# Patient Record
Sex: MALE | Race: WHITE | Employment: FULL TIME | URBAN - NONMETROPOLITAN AREA
[De-identification: names, ages, dates, MRNs, and addresses within clinical notes are randomized per-mention and may not be internally consistent; named-entity substitution may affect disease eponyms.]

---

## 2017-03-09 ENCOUNTER — TRANSFERRED RECORDS (OUTPATIENT)
Dept: HEALTH INFORMATION MANAGEMENT | Facility: HOSPITAL | Age: 42
End: 2017-03-09

## 2017-03-10 ENCOUNTER — HOSPITAL ENCOUNTER (INPATIENT)
Facility: HOSPITAL | Age: 42
LOS: 11 days | Discharge: HOME | DRG: 885 | End: 2017-03-21
Attending: PSYCHIATRY & NEUROLOGY | Admitting: PSYCHIATRY & NEUROLOGY
Payer: COMMERCIAL

## 2017-03-10 DIAGNOSIS — F29 PSYCHOSIS, UNSPECIFIED PSYCHOSIS TYPE (H): Primary | ICD-10-CM

## 2017-03-10 PROCEDURE — 25000132 ZZH RX MED GY IP 250 OP 250 PS 637: Performed by: NURSE PRACTITIONER

## 2017-03-10 PROCEDURE — 12400000 ZZH R&B MH

## 2017-03-10 RX ORDER — LURASIDONE HYDROCHLORIDE 40 MG/1
40 TABLET, FILM COATED ORAL
Status: DISCONTINUED | OUTPATIENT
Start: 2017-03-10 | End: 2017-03-11

## 2017-03-10 RX ORDER — OLANZAPINE 10 MG/1
10 TABLET ORAL
Status: DISCONTINUED | OUTPATIENT
Start: 2017-03-10 | End: 2017-03-11

## 2017-03-10 RX ORDER — HYDROXYZINE HYDROCHLORIDE 25 MG/1
25-50 TABLET, FILM COATED ORAL EVERY 4 HOURS PRN
Status: DISCONTINUED | OUTPATIENT
Start: 2017-03-10 | End: 2017-03-21 | Stop reason: HOSPADM

## 2017-03-10 RX ORDER — NICOTINE 21 MG/24HR
1 PATCH, TRANSDERMAL 24 HOURS TRANSDERMAL DAILY
Status: DISCONTINUED | OUTPATIENT
Start: 2017-03-10 | End: 2017-03-21 | Stop reason: HOSPADM

## 2017-03-10 RX ORDER — TRAZODONE HYDROCHLORIDE 50 MG/1
50 TABLET, FILM COATED ORAL
Status: DISCONTINUED | OUTPATIENT
Start: 2017-03-10 | End: 2017-03-13

## 2017-03-10 RX ORDER — ACETAMINOPHEN 325 MG/1
650 TABLET ORAL EVERY 4 HOURS PRN
Status: DISCONTINUED | OUTPATIENT
Start: 2017-03-10 | End: 2017-03-21 | Stop reason: HOSPADM

## 2017-03-10 RX ORDER — OLANZAPINE 10 MG/2ML
10 INJECTION, POWDER, FOR SOLUTION INTRAMUSCULAR
Status: DISCONTINUED | OUTPATIENT
Start: 2017-03-10 | End: 2017-03-11

## 2017-03-10 RX ADMIN — LURASIDONE HYDROCHLORIDE 40 MG: 40 TABLET, FILM COATED ORAL at 17:11

## 2017-03-10 RX ADMIN — NICOTINE 1 PATCH: 21 PATCH, EXTENDED RELEASE TRANSDERMAL at 13:27

## 2017-03-10 ASSESSMENT — ACTIVITIES OF DAILY LIVING (ADL)
FALL_HISTORY_WITHIN_LAST_SIX_MONTHS: NO
ORAL_HYGIENE: INDEPENDENT
GROOMING: INDEPENDENT
SWALLOWING: 0-->SWALLOWS FOODS/LIQUIDS WITHOUT DIFFICULTY
RETIRED_COMMUNICATION: 0-->UNDERSTANDS/COMMUNICATES WITHOUT DIFFICULTY
DRESS: 0-->INDEPENDENT
BATHING: 0-->INDEPENDENT
TOILETING: 0-->INDEPENDENT
COGNITION: 0 - NO COGNITION ISSUES REPORTED
RETIRED_EATING: 0-->INDEPENDENT
DRESS: SCRUBS (BEHAVIORAL HEALTH)
AMBULATION: 0-->INDEPENDENT
TRANSFERRING: 0-->INDEPENDENT
LAUNDRY: UNABLE TO COMPLETE

## 2017-03-10 NOTE — IP AVS SNAPSHOT
[facility]    [address] [place] [address]    Phone:  [phone]    Fax:  [phone]                                       After Visit Summary   3/10/2017    Bandar Lawson    MRN: [id number]           After Visit Summary Signature Page     I have received my discharge instructions, and my questions have been answered. I have discussed any challenges I see with this plan with the nurse or doctor.    ..........................................................................................................................................  Patient/Patient Representative Signature      ..........................................................................................................................................  Patient Representative Print Name and Relationship to Patient    ..................................................               ................................................  Date                                            Time    ..........................................................................................................................................  Reviewed by Signature/Title    ...................................................              ..............................................  Date                                                            Time

## 2017-03-10 NOTE — PLAN OF CARE
Problem: Goal Outcome Summary  Goal: Goal Outcome Summary  ADMISSION NOTE     Reason for admission suicidal ideation, paranoia and psychosis .  Safety concerns active suicidal ideation with a plan.  Risk for or history of violence-none known.      Patient arrived on unit from [facility] accompanied by ambulance staff and [facility] on 3/10/2017  10:30 AM.   Status on arrival: calm, cooperative, anxious.  /87  Pulse 87  Temp 97.6  F (36.4  C) (Tympanic)  Resp 18  SpO2 98%  Patient given tour of unit and Welcome to  unit papers given to patient, wanding completed, belongings inventoried, and admission assessment completed.   Patient's legal status on arrival is 72 Hour Hold . Appropriate legal rights discussed with and copy given to patient. Patient Bill of Rights discussed with and copy given to patient.   Patient denies SI, HI, and thoughts of self harm and contracts for safety while on unit.       Chio Ford  3/10/2017  11:08 AM
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
## 2017-03-10 NOTE — PLAN OF CARE
Face to face end of shift report received from JULIO CESAR Barroso. Rounding completed. Patient [place] in [facility].      George Germain  3/10/2017  4:04 PM

## 2017-03-10 NOTE — PLAN OF CARE
"0450- Received nurse to nurse report from Eugenia HARRELL at [facility]. Per report pt was brought in to their ED by his sister. 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 Pt took all his money out of his bank account and was going to \"make a run for it.\" Pt called his sister and reported to her that he was [place] the [place] of [place]. Pt agreed to meet the sister half way which was [facility]. When brought into the ED pt reported that he wanted to shoot himself but had no guns available to him. Pt recently was prescribed Latuda but has not yet started the taking the medication. Pt received 2 bags of fluid in the ED due to pt coming in tachycardic. PB was also elevated on admission but has now returned to normal limits. Pt also received Zyprexa 20 mg PO at 2150 on 3/9. Per report pt tox screen and BA was negative, no history of violence, no medical issues pertinent to his care, no allergies, and no assistive devises. Pt has been placed on a hold however no hold papers were faxed over, will be faxing over hold papers. Pharmacy unknown at this time. Per report pt has been cooperative with staff but becomes paranoid easily. Pt also has been pushing on doors in the ED attempting to flea. [facility] ED will call with an ETA when transportation has been set up. Phone number is [phone].     9713- Received call that pt has left their ED. Pt was given Zyprexa 10 mg at 0640 for \"getting anxious and worked up.\"   "

## 2017-03-10 NOTE — PROGRESS NOTES
03/10/17 1121   Patient Belongings   Did you bring any home meds/supplements to the hospital?  No   Patient Belongings other (see comments)   Disposition of Belongings sent to cubby in patient belongings room   Belongings Search Yes   Clothing Search Yes   Second Staff Navarro   General Info Comment Brown cowboy boots, brown carhart jackect, 1 pair blue jeans, 1 green shirt, 1 red and black checked fleece jacket, 1 brown cap (CAT), 1 pair plaid boxers, 1 pair white socks, 1 pair blue scrubs, red lighter chap stick, empty baggie with nicotine gum wrapper    List items sent to safe: $5,150.36, 1 black wallet, [place], 1 Visa ([facility] [employer]), misc. cards, Flip phone, set of keys    All other belongings put in assigned cubby in belongings room.     I have reviewed my belongings list on admission and verify that it is correct.     Patient signature_______________________________    Second staff witness (if patient unable to sign) ______________________________       I have received all my belongings at discharge.    Patient signature________________________________    Aura   3/10/2017  11:25 AM

## 2017-03-10 NOTE — IP AVS SNAPSHOT
MRN:[id number]                      After Visit Summary   3/10/2017    Bandar Lawson    MRN: [id number]           Thank you!     Thank you for choosing [facility] for your care. Our goal is always to provide you with excellent care. Hearing back from our patients is one way we can continue to improve our services. Please take a few minutes to complete the written survey that you may receive in the mail after you visit with us. Thank you!        Patient Information     Date Of Birth          1975        About your hospital stay     You were admitted on:  March 10, 2017 You last received care in the: [facility]    You were discharged on:  March 21, 2017       Who to Call     For medical emergencies, please call 911.  For non-urgent questions about your medical care, please call your primary care provider or clinic, [phone]          Attending Provider     Provider Specialty    Samuel Pereira MD Psychiatry    Joyce Nick APRN CNP Nurse Practitioner       Primary Care Provider Office Phone # Fax #    Edgar Powers [phone]       [facility] [id number] [place] [address]        Further instructions from your care team       Behavioral Discharge Planning and Instructions    Summary: Bandar was admitted with suicidal ideation, paranoia and psychosis.    Main Diagnosis: delusional disorder vs schizoaffective disorder vs mood disorder w/ psychotic features.    Major Treatments, Procedures and Findings: Stabilize with medications, connect with community programs.     Symptoms to Report: feeling more aggressive, increased confusion, losing more sleep, mood getting worse or thoughts of suicide    Lifestyle Adjustment: Take all medications as prescribed, meet with doctor/medication provider as scheduled. Abstain from alcohol or any unprescribed drugs.     Psychiatry Follow-up:    [facility]   [employer] - Joyce Nick -  March 29th, @ 8:30 am.  [address]   [place] [address]  Phone: [phone]  Fax: [phone]     [facility] Care Coordinator: Kiara [phone]    [facility] : Kaylee Gomez- call [phone] Call to follow up with.  [facility]   [address]  3rd floor, [address]  [place] [address]  Phone: [phone]  Fax: [phone]    Resources:   Crisis Intervention: 483.165.4143 or 319-300-2606 (TTY: 602.551.2430).  Call anytime for help.  National [place] on Mental Illness (www.[place].andressa.org): 496.870.4953 or 221-903-3535.  Alcoholics Anonymous (www.alcoholics-anonymous.org): Check your phone book for your local chapter.  Suicide Awareness Voices of Education (SAVE) (www.save.org): 181-192-CUPW (7300)  National Suicide Prevention Line (www.mentalhealthmn.org): 591-109-ZXJY (1572)  Mental Health Consumer/Survivor Network of [place] (www.mhcsn.net): 302.602.9272 or 316-971-9189  Mental Health Association of [place] (www.mentalhealth.org): 155.554.2961 or 333-629-7615    General Medication Instructions:   See your medication sheet(s) for instructions.   Take all medicines as directed.  Make no changes unless your doctor suggests them.   Go to all your doctor visits.  Be sure to have all your required lab tests. This way, your medicines can be refilled on time.  Do not use any drugs not prescribed by your doctor.  Avoid alcohol.    [place] Area:  [facility][1], [place] stabilization [facility]- [phone]  Replaced by [facility] Crisis Line: 1-117.851.7182  Advocates For Family Peace: 991-4685  Sexual Assault Program [facility]: 974.711.1930 or 1-481.347.7335  [place] Battered Women's Program: 8-141-602-7621 Ext: 279       Calls answered Mon-Fri-8:00 am--4:30 pm    [place]:  Advocates for Family Peace: 1-269.470.2109  [facility] first call for help: 1-616.537.1066  [facility]:  (728) 669-7421      [place] Area:  Warm Line: 1-106.658.5477        "Calls answered Tuesday--Saturday 4:00 pm--10:00 pm  [place] Crisis Line - 366-297-3279  [place] [facility] Stabilization 291-290-2892    [place] Statewide:  [place] Crisis and Referral Services: 1-751.372.2809  National Suicide Prevention Lifeline: 8-304-899-TALK (6482)   - udb2lutr- Text  Life  to [address]  First Call for Help:   [place] Helpline- 6-562-BZCL-HELP     Pending Results     No orders found from 3/8/2017 to 3/11/2017.            Statement of Approval     Ordered          17 0906  I have reviewed and agree with all the recommendations and orders detailed in this document.  EFFECTIVE NOW     Approved and electronically signed by:  Keily Luo NP             Admission Information     Date & Time Provider Department Dept. Phone    3/10/2017 Joyce Nick APRN CNP [facility] [phone]      Your Vitals Were     Blood Pressure Pulse Temperature Respirations Weight Pulse Oximetry    128/71 97 97.9  F (36.6  C) (Tympanic) 18 95.3 kg (210 lb 1.6 oz) 98%    BMI (Body Mass Index)                   27.72 kg/m2           MyChart Information     [employer] lets you send messages to your doctor, view your test results, renew your prescriptions, schedule appointments and more. To sign up, go to [email or web address] . Click on \"Log in\" on the left side of the screen, which will take you to the Welcome page. Then click on \"Sign up Now\" on the right side of the page.     You will be asked to enter the access code listed below, as well as some personal information. Please follow the directions to create your username and password.     Your access code is: [email or web address]  Expires: 2017  9:05 AM     Your access code will  in 90 days. If you need help or a new code, please call your [facility] clinic or [phone].        Care EveryWhere ID     This is your Care EveryWhere ID. This could be used by other organizations to access your [facility] medical records  [id number]           Review of your " medicines      START taking        Dose / Directions    paliperidone 6 MG 24 hr tablet   Commonly known as:  INVEGA        Dose:  6 mg   Take 1 tablet (6 mg) by mouth daily   Quantity:  30 tablet   Refills:  0       valproic acid 250 MG/5ML Syrp syrup   Commonly known as:  DEPAKENE        Dose:  750 mg   Take 15 mLs (750 mg) by mouth every 12 hours   Quantity:  650 mL   Refills:  0         STOP taking     LATUDA PO                Where to get your medicines      These medications were sent to [facility] [address] [place] [address] AT NEC OF [address] [place] [address]  [address] [address]     Phone:  [phone]     paliperidone 6 MG 24 hr tablet    valproic acid 250 MG/5ML Syrp syrup                Protect others around you: Learn how to safely use, store and throw away your medicines at www.disposemymeds.org.             Medication List: This is a list of all your medications and when to take them. Check marks below indicate your daily home schedule. Keep this list as a reference.      Medications           Morning Afternoon Evening Bedtime As Needed    paliperidone 6 MG 24 hr tablet   Commonly known as:  INVEGA   Take 1 tablet (6 mg) by mouth daily   Last time this was given:  6 mg on 3/21/2017  8:08 AM                                valproic acid 250 MG/5ML Syrp syrup   Commonly known as:  DEPAKENE   Take 15 mLs (750 mg) by mouth every 12 hours   Last time this was given:  750 mg on 3/21/2017  8:08 AM

## 2017-03-10 NOTE — PLAN OF CARE
Problem: Discharge Planning  Goal: Discharge Planning (Adult, OB, Behavioral, Peds)  Outcome: No Change  Patient arrived on the unit - was given Zyprexa in the ED and is sleeping soundly - will wait to do assessment until the patient is more awake and able to participate.

## 2017-03-11 PROCEDURE — 99223 1ST HOSP IP/OBS HIGH 75: CPT | Performed by: NURSE PRACTITIONER

## 2017-03-11 PROCEDURE — 12400000 ZZH R&B MH

## 2017-03-11 PROCEDURE — 25000132 ZZH RX MED GY IP 250 OP 250 PS 637: Performed by: NURSE PRACTITIONER

## 2017-03-11 RX ORDER — BENZTROPINE MESYLATE 0.5 MG/1
0.5 TABLET ORAL 2 TIMES DAILY PRN
Status: DISCONTINUED | OUTPATIENT
Start: 2017-03-11 | End: 2017-03-21 | Stop reason: HOSPADM

## 2017-03-11 RX ORDER — LURASIDONE HYDROCHLORIDE 80 MG/1
80 TABLET, FILM COATED ORAL
Status: DISCONTINUED | OUTPATIENT
Start: 2017-03-12 | End: 2017-03-12

## 2017-03-11 RX ORDER — OLANZAPINE 10 MG/1
10 TABLET ORAL
Status: DISCONTINUED | OUTPATIENT
Start: 2017-03-11 | End: 2017-03-12

## 2017-03-11 RX ORDER — OLANZAPINE 10 MG/2ML
10 INJECTION, POWDER, FOR SOLUTION INTRAMUSCULAR
Status: DISCONTINUED | OUTPATIENT
Start: 2017-03-11 | End: 2017-03-12

## 2017-03-11 RX ORDER — DIPHENHYDRAMINE HCL 25 MG
25 CAPSULE ORAL EVERY 4 HOURS PRN
Status: DISCONTINUED | OUTPATIENT
Start: 2017-03-11 | End: 2017-03-21 | Stop reason: HOSPADM

## 2017-03-11 RX ORDER — LURASIDONE HYDROCHLORIDE 40 MG/1
40 TABLET, FILM COATED ORAL
Status: COMPLETED | OUTPATIENT
Start: 2017-03-11 | End: 2017-03-11

## 2017-03-11 RX ADMIN — OLANZAPINE 10 MG: 10 TABLET, FILM COATED ORAL at 15:19

## 2017-03-11 RX ADMIN — NICOTINE 1 PATCH: 21 PATCH, EXTENDED RELEASE TRANSDERMAL at 08:31

## 2017-03-11 RX ADMIN — DIPHENHYDRAMINE HYDROCHLORIDE 25 MG: 25 CAPSULE ORAL at 11:24

## 2017-03-11 RX ADMIN — OLANZAPINE 10 MG: 10 TABLET, FILM COATED ORAL at 21:08

## 2017-03-11 RX ADMIN — LURASIDONE HYDROCHLORIDE 40 MG: 40 TABLET, FILM COATED ORAL at 16:40

## 2017-03-11 RX ADMIN — OLANZAPINE 10 MG: 10 TABLET, FILM COATED ORAL at 09:04

## 2017-03-11 ASSESSMENT — ACTIVITIES OF DAILY LIVING (ADL)
ORAL_HYGIENE: INDEPENDENT
GROOMING: INDEPENDENT
DRESS: INDEPENDENT;SCRUBS (BEHAVIORAL HEALTH)
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
## 2017-03-11 NOTE — PLAN OF CARE
Face to face end of shift report received from George HUITRON RN. Rounding completed. Patient observed in [facility].     Jane Ricks  3/11/2017  3:25 PM

## 2017-03-11 NOTE — PLAN OF CARE
Face to face end of shift report received from JULIO CESAR Phillip. Rounding completed. Patient observed in room.      George Germain  3/11/2017  7:49 AM

## 2017-03-11 NOTE — PLAN OF CARE
Problem: Goal Outcome Summary  Goal: Goal Outcome Summary  0000 patient in bed with eyes closed and respirations easy presenting sleeping. Status 15 continues. 0522 patient continues to sleep this shift

## 2017-03-11 NOTE — PROGRESS NOTES
Face to face end of shift report received from gracy griggs at 2300 report.. Rounding completed. Patient observed.     Frida Grady  3/11/2017  12:34 AM

## 2017-03-11 NOTE — PLAN OF CARE
"Problem: Goal Outcome Summary  Goal: Goal Outcome Summary  He is awake this morning. He ate about 75% of breakfast. He is quite paranoid and feels that people are after him. This writer talked with him about the paranoia being a symptom of his mental illness and that it is possible that people are not actually out to get him.  He states \"I suppose, but I know they are after me\".  He accepted zyprexa 10mg orally at 0904. He continues to be anxious and then starts asking for his clothing and wanting to go home. He talked with his parents on the phone and was feeling upset that they may not visit. His mother did call the unit and said that they will visit this evening. He was informed of this. He asked several times for his clothing and to leave with increased paranoia. He did then return to the ICU without any difficulty.   1124: Patient continues to be somewhat restless. Given benadryl 25 mg at this time. He requests to wean to the open unit again and is doing so now. [facility] and is watching others. He does walk a bit in the shafer but doesn't sit for long.   1450: Patient has been in his room sleeping for about an hour.   1525: Patient requesting to come to the open unit. Patient is anxious and accepts zyprexa 10mg at this time. He then is brought into the group room for group.     Problem: Suicide Risk (Adult)  Goal: Strength-Based Wellness/Recovery  Patient will deny suicidal ideation by discharge.   He initially denies any suicidal thoughts. But then he asks for a gun and makes statements about \"this is the end, it's all over for me\".   Goal: Physical Safety  Patient will be free from harming self and others while on the unit until discharge.   He is not acting on any thoughts of self harm.     Problem: Additional Goals: Nursing Focus  Goal: 1. Patient Goal: Nursing Focus  Patient may wean to the open unit as long as behavior remains appropriate. Will reassess daily.   He has weaned for about " 20 minutes at a time and has difficulty focusing [facility]    Problem: Thought Process Alteration (Adult)  Goal: Improved Thought Process  Paranoid thoughts will resolve by discharge.   He continues to be paranoid and feels that people are after him. He is anxious and paces in his room.

## 2017-03-12 PROCEDURE — 12400000 ZZH R&B MH

## 2017-03-12 PROCEDURE — 99232 SBSQ HOSP IP/OBS MODERATE 35: CPT | Performed by: NURSE PRACTITIONER

## 2017-03-12 PROCEDURE — 25000132 ZZH RX MED GY IP 250 OP 250 PS 637: Performed by: NURSE PRACTITIONER

## 2017-03-12 RX ORDER — DIVALPROEX SODIUM 250 MG/1
250 TABLET, DELAYED RELEASE ORAL EVERY 12 HOURS SCHEDULED
Status: DISCONTINUED | OUTPATIENT
Start: 2017-03-12 | End: 2017-03-13

## 2017-03-12 RX ORDER — ARIPIPRAZOLE 10 MG/1
10 TABLET, ORALLY DISINTEGRATING ORAL DAILY
Status: DISCONTINUED | OUTPATIENT
Start: 2017-03-12 | End: 2017-03-13

## 2017-03-12 RX ADMIN — NICOTINE 1 PATCH: 21 PATCH, EXTENDED RELEASE TRANSDERMAL at 08:36

## 2017-03-12 RX ADMIN — OLANZAPINE 10 MG: 10 TABLET, FILM COATED ORAL at 08:36

## 2017-03-12 RX ADMIN — NICOTINE POLACRILEX 2 MG: 2 GUM, CHEWING ORAL at 22:12

## 2017-03-12 RX ADMIN — ARIPIPRAZOLE 10 MG: 10 TABLET, ORALLY DISINTEGRATING ORAL at 12:15

## 2017-03-12 RX ADMIN — NICOTINE POLACRILEX 2 MG: 2 GUM, CHEWING ORAL at 18:56

## 2017-03-12 RX ADMIN — DIPHENHYDRAMINE HYDROCHLORIDE 25 MG: 25 CAPSULE ORAL at 08:37

## 2017-03-12 RX ADMIN — DIVALPROEX SODIUM 250 MG: 250 TABLET, DELAYED RELEASE ORAL at 20:04

## 2017-03-12 RX ADMIN — DIVALPROEX SODIUM 250 MG: 250 TABLET, DELAYED RELEASE ORAL at 12:15

## 2017-03-12 ASSESSMENT — ACTIVITIES OF DAILY LIVING (ADL)
DRESS: INDEPENDENT
ORAL_HYGIENE: INDEPENDENT
GROOMING: INDEPENDENT

## 2017-03-12 NOTE — PLAN OF CARE
Problem: Goal Outcome Summary  Goal: Goal Outcome Summary  Outcome: No Change  Pt has been pleasant and cooperative with staff this shift. Pt reports feeling less paranoid and feels as though the medication is helping with his thoughts. Pt denied having depression, SI, and HI. He does endorse some anxiety but feels safe here. Pt has weaned out to the open unit, attended group and has been appropriate with his behaviors. Pt has asked a couple times this shift when he was leaving and stated to staff that he thinks his hold is up on Tuesday and is hoping to leave then. Pt denied having any pain and has no further questions at this time. Will continue to monitor.     1900- Pt has continued to be appropriate with his behaviors and has weaned to the open unit. Pt is socializing and playing cards with peers.     Problem: Suicide Risk (Adult)  Goal: Strength-Based Wellness/Recovery  Patient will deny suicidal ideation by discharge.   Outcome: No Change  Pt denied having any SI at this time. Will continue to monitor.   Goal: Physical Safety  Patient will be free from harming self and others while on the unit until discharge.   Outcome: No Change  Pt has remained free from self harm at this time. Will continue to monitor.     Problem: Additional Goals: Nursing Focus  Goal: 1. Patient Goal: Nursing Focus  Patient able to wean to open unit as long as his behavior remains in control. He will be compliant with safety check prior to returning to the [facility].   Outcome: Improving  Pt has wean to open unit this shift. Pt has remained appropriate at this time.     Problem: Thought Process Alteration (Adult)  Goal: Improved Thought Process  Paranoid thoughts will resolve by discharge.   Outcome: Improving  Pt reported that the paranoid thoughts are decreasing.

## 2017-03-12 NOTE — PROGRESS NOTES
"[facility]  Psychiatric Progress Note    Subjective       This is a 41 year old male admitted w/ delusional disorder vs schizoaffective disorder vs mood disorder w/ psychotic features. Bandar this am reports his paranoia is \"less intense\" than yesterday but \"still there\". He is very focused on \"getting out of here and getting back to work\". I reminded him that his employer just wants him to get better his supervisor is concerned about him, and had stated Bandar is an outstanding employee and they want him back.     I spoke w/ parents last evening when they came to visit, they are concerned that he may be released following his hold, before his symptoms are controlled. He has never taken a large amount of money out of the bank and taken off before which concerns them greatly.      Bandar denies feelings of depression, denies anxiety, AH/VH, and SI/HI. No statements of self harm today his reply when questioned was \"I would never do that\". Bandar reports \"really good sleep\" when he slept last night. No statements today about the \"famous woman\" he continues to make statements re: fear of being outside as he will not live long with them after him. Will not define \"them\".         DIagnoses:     Delusional disorder vs schizoaffective disorder vs mood disorder w/ psychotic features  Attestation:  Patient has been seen and evaluated by me, Joyce Nick, APRN CNP, in the presence of the house staff team          Interim History:   The patient's care was discussed with the treatment team and chart notes were reviewed.          Medications:       lurasidone  80 mg Oral Daily with supper     nicotine   Transdermal Q8H     nicotine   Transdermal Daily     nicotine  1 patch Transdermal Daily     OLANZapine **OR** OLANZapine, diphenhydrAMINE, benztropine, hydrOXYzine, acetaminophen, traZODone          Allergies:   No Known Allergies         Psychiatric Examination:   /66  Pulse 100  Temp 98.6  F (37  C) (Tympanic) "  Resp 14  SpO2 98%  Weight is 0 lbs 0 oz  There is no height or weight on file to calculate BMI.    Appearance:  awake, alert and dressed in hospital scrubs  Attitude:  guarded  Eye Contact:  fair  Mood:  anxious  Affect:  mood congruent  Speech:  clear, coherent  Psychomotor Behavior:  no evidence of tardive dyskinesia, dystonia, or tics and intact station, gait and muscle tone  Thought Process:  goal oriented  Associations:  no loose associations  Thought Content:  no evidence of suicidal ideation or homicidal ideation and patient appears to be responding to internal stimuli  Insight:  limited  Judgment:  limited  Oriented to:  time, person, and place  Attention Span and Concentration:  fair  Recent and Remote Memory:  fair  Fund of Knowledge: appropriate  Muscle Strength and Tone: normal  Gait and Station: Normal  Perception : pt remains paranoid and delusional         Labs:   No results found for this or any previous visit (from the past 24 hour(s)).          Assessment/ Plan:   Medications: Continue on current medications.

## 2017-03-12 NOTE — PLAN OF CARE
Face to face end of shift report received from JULIO CESAR Phillip. Rounding completed. Patient observed in room.      George Germain  3/12/2017  7:42 AM

## 2017-03-12 NOTE — PLAN OF CARE
"Problem: Goal Outcome Summary  Goal: Goal Outcome Summary  He continues with depression, anxiety, and suicidal thinking. He is preoccupied and quite paranoid. He needs a lot of reassurance and redirection. He was given zyprexa 10mg and benadryl 25mg at 0836 with little relief. Practitioner aware and making medication change.   1500:  Patient started depakote 250mg and abilify 10mg at about noon today. He rested for a while then weaned to the open unit from 1430 to 1500 then returned to the MHICU was appropriate with safety check.     Problem: Suicide Risk (Adult)  Goal: Strength-Based Wellness/Recovery  Patient will deny suicidal ideation by discharge.   When asked about suicidal thoughts he says \"yes, I think so\" and does contract for safety at that time.   Goal: Physical Safety  Patient will be free from harming self and others while on the unit until discharge.   He has not make any attempts to harm himself.     Problem: Additional Goals: Nursing Focus  Goal: 1. Patient Goal: Nursing Focus  Patient able to wean to open unit as long as his behavior remains in control. He will be compliant with safety check prior to returning to the MHICU.   Behavior appropriate this morning. Ate breakfast in the lounge. Then became increasingly paranoid and was banging on the entry doors to the unit.  Easily redirected back to MHICU and compliant with safety check.    Problem: Thought Process Alteration (Adult)  Goal: Improved Thought Process  Paranoid thoughts will resolve by discharge.   Continues with paranoia. Patient weaning to the open unit and ate breakfast. He was then walking in the halls. He went to the entry doors and was banging on them with his fists, saying \"I need to get out of here\". He was easily redirected back into the MHICU.       "

## 2017-03-12 NOTE — PLAN OF CARE
Problem: Goal Outcome Summary  Goal: Goal Outcome Summary  0000  Patient in bed with easy respirations, presenting sleeping. In the mhicu, still status 15. 0526 patient continues to sleep at this time. daylilght savings time night.

## 2017-03-12 NOTE — PROGRESS NOTES
Face to face end of shift report received from amaya griggs at 2300 report.. Rounding completed. Patient observed.     Frida Grady  3/12/2017  12:31 AM

## 2017-03-12 NOTE — PLAN OF CARE
"Problem: Suicide Risk (Adult)  Goal: Strength-Based Wellness/Recovery  Patient will deny suicidal ideation by discharge.   Outcome: Improving  Pt denies SI HI SIB  Goal: Physical Safety  Patient will be free from harming self and others while on the unit until discharge.   Outcome: No Change  Pt has been free of self harm and harm to other thus far this shift    Problem: Additional Goals: Nursing Focus  Goal: 1. Patient Goal: Nursing Focus  Patient may wean to the open unit as long as behavior remains appropriate. Will reassess daily.   Outcome: No Change  Pt able to wean this shift up until 2015. At which time it was discovered during environmentals that pt had 3 colored pencils under his mattress.  This was reported to charge nurse and writer. Writer called security to accompany writer to back along with charge nurse. Pt approached about pencils and he stated \" yes I did. I wanted to scribble\". Pt asked to go to bathroom and change into new scrubs while security did a second look around room. Writer watch pt change scrubs. Security did not find any other contraband. Pt returned to bed.    Problem: Thought Process Alteration (Adult)  Goal: Improved Thought Process  Paranoid thoughts will resolve by discharge.   Outcome: No Change  Pt appears to be paranoid but does not answer questions related to topic      "

## 2017-03-12 NOTE — PLAN OF CARE
Face to face end of shift report received from George DORAN RN. Rounding completed. Patient observed resting in bed.     Jhony Bocanegra  3/12/2017  3:27 PM

## 2017-03-12 NOTE — PLAN OF CARE
"Problem: Goal Outcome Summary  Goal: Goal Outcome Summary  Outcome: No Change  Pt does not say much when asked assessment questions. Pt did deny SI HI SIB. Pt denies hallucinations but appears preoccupied. Pt stares at writer intensely. Pt has a short delay with responses. Pt has blunted affect. Pt attended some groups. Pts parents and brother visited this evening. Not a lot of interaction during visit. Pt got up several times and walked away and then returned to visit    2136: Pt able to wean this shift up until 2015. At which time it was discovered during environmentals that pt had 3 colored pencils under his mattress. This was reported to charge nurse and writer. Writer called security to accompany writer to back along with charge nurse. Pt approached about pencils and he stated \" yes I did. I wanted to scribble\". Pt asked to go to bathroom and change into new scrubs while security did a second look around room. Writer watch pt change scrubs. Security did not find any other contraband. Pt returned to bed.  At 2115 pt asked to come out of MHICU. Writer and charge nurse went to talk with pt and explained d/t him bring the pencils in the back he could not wean out any more tonight. Pt accepted this information. Pt stated he was restless/anxious when asked. Pt offered a PRN of zyprexa 10mg. Pt took PRN without issue. Pt currently resting in bed.  "

## 2017-03-13 LAB
TSH SERPL DL<=0.005 MIU/L-ACNC: 1.18 MU/L (ref 0.4–4)
VIT B12 SERPL-MCNC: 610 PG/ML (ref 193–986)

## 2017-03-13 PROCEDURE — 12400000 ZZH R&B MH

## 2017-03-13 PROCEDURE — 99233 SBSQ HOSP IP/OBS HIGH 50: CPT | Performed by: NURSE PRACTITIONER

## 2017-03-13 PROCEDURE — 84443 ASSAY THYROID STIM HORMONE: CPT | Performed by: NURSE PRACTITIONER

## 2017-03-13 PROCEDURE — 82607 VITAMIN B-12: CPT | Performed by: NURSE PRACTITIONER

## 2017-03-13 PROCEDURE — 25000132 ZZH RX MED GY IP 250 OP 250 PS 637: Performed by: NURSE PRACTITIONER

## 2017-03-13 PROCEDURE — 82306 VITAMIN D 25 HYDROXY: CPT | Performed by: NURSE PRACTITIONER

## 2017-03-13 PROCEDURE — 36415 COLL VENOUS BLD VENIPUNCTURE: CPT | Performed by: NURSE PRACTITIONER

## 2017-03-13 RX ORDER — ARIPIPRAZOLE 15 MG/1
15 TABLET, ORALLY DISINTEGRATING ORAL DAILY
Status: DISCONTINUED | OUTPATIENT
Start: 2017-03-14 | End: 2017-03-15

## 2017-03-13 RX ORDER — DIVALPROEX SODIUM 500 MG/1
500 TABLET, DELAYED RELEASE ORAL EVERY 12 HOURS SCHEDULED
Status: DISCONTINUED | OUTPATIENT
Start: 2017-03-13 | End: 2017-03-14

## 2017-03-13 RX ADMIN — NICOTINE 1 PATCH: 21 PATCH, EXTENDED RELEASE TRANSDERMAL at 08:46

## 2017-03-13 RX ADMIN — ARIPIPRAZOLE 10 MG: 10 TABLET, ORALLY DISINTEGRATING ORAL at 08:46

## 2017-03-13 RX ADMIN — NICOTINE POLACRILEX 4 MG: 2 GUM, CHEWING ORAL at 17:35

## 2017-03-13 RX ADMIN — DIVALPROEX SODIUM 500 MG: 500 TABLET, DELAYED RELEASE ORAL at 20:33

## 2017-03-13 RX ADMIN — NICOTINE POLACRILEX 4 MG: 2 GUM, CHEWING ORAL at 09:36

## 2017-03-13 RX ADMIN — DIVALPROEX SODIUM 250 MG: 250 TABLET, DELAYED RELEASE ORAL at 08:46

## 2017-03-13 NOTE — PROGRESS NOTES
Face to face end of shift report received from cheli griggs at 2300 report.. Rounding completed. Patient observed.     Frida Grady  3/13/2017  2:42 AM

## 2017-03-13 NOTE — PLAN OF CARE
Problem: Goal Outcome Summary  Goal: Goal Outcome Summary  BEHAVIORAL TEAM DISCUSSION     Continued Stay Criteria/Rationale: acute suicidal ideation, on 72 hour hold  Plan: stabilize on medication, petition for committment  Participants: Nursing, Social work, OT, Psychiatric Nurse Practitioner, Recreation Therapy  Summary/Recommendation: Continue to stabilize with medication and work on discharge planning.  Medical/Physical: see H&P  Progress: minimal improvement

## 2017-03-13 NOTE — PLAN OF CARE
Problem: Goal Outcome Summary  Goal: Goal Outcome Summary  0100 patient in bed with eyes closed and respirations easy presenting sleeping. Room in the mhicu. Status 15. About 0107 patient came to the nursing station wet. Mague went off in his room. He was wanting to help clean up and he was thanked but deferred. He showered  And was resettled into open seclusion room on mattress on the floor with the door open. This was okayed by supervisor meera and this writer did call shana ayala on call np. She also okayed this. She does not want him moved out. Message left with voice mail for maria dolores solorio Behavioral health supervisor. Patient was cooperative and fine with sleeping in the open seclusion. The third room in the mhicu is not usable to move patient into. Patient did go to sleep.  0522 patient has slept since and continues to sleep at this time. 0615 patient cooperative with lab draw and returned to bed and settling in. 0625 patient awake and cooperative with vitals and then he moved back to his room 568. Patient continues with the red ger that he has had so far this shift. This is not new per the patient.

## 2017-03-13 NOTE — PLAN OF CARE
"Problem: Goal Outcome Summary  Goal: Goal Outcome Summary  Patient was calm and cooperative with nurse assessment and medications. Patient  States \"I feel like I don't have many days left\". Patient also talks about wanting to go home and and go back to work. Patient deneis SI and verbalizes that he will not harm self on the unit.  Patient weaned all shift to the open unit. Patient was appropriate and attended groups. Patient was cooperative with safety check before entering the MHICU. Patient states \" This is worse than [facility] they don't even do this many safety checks.\"     Problem: Suicide Risk (Adult)  Goal: Strength-Based Wellness/Recovery  Patient will deny suicidal ideation by discharge.   Patient denies SI this shift.  Goal: Physical Safety  Patient will be free from harming self and others while on the unit until discharge.   Patient was free from self harm on the unit this shift.    Problem: Additional Goals: Nursing Focus  Goal: 1. Patient Goal: Nursing Focus  Patient able to wean to open unit as long as his behavior remains in control. He will be compliant with safety check prior to returning to the MHICU.   Patient did wean to open unit this shift. Patient was appropriate and cooperative with safety checks.    Problem: Thought Process Alteration (Adult)  Goal: Improved Thought Process  Paranoid thoughts will resolve by discharge.   Patient did not make any paranoid statements this shift.      "

## 2017-03-13 NOTE — PLAN OF CARE
Problem: Discharge Planning  Goal: Discharge Planning (Adult, OB, Behavioral, Peds)  Writer emailed a request for commitment screening to Kalie Burgos at [facility]- called and left a voice mail requesting confirmation of receipt.

## 2017-03-13 NOTE — PROGRESS NOTES
"[facility]  [facility] Note    Subjective   This is my first time meeting michelle. This morning he told staff that he wanted to be dc'd so he could go home and kill himself. He specifically said he would shoot himself. Today when i met him he stated \"I guess when you want to go home, that is not the best thing to say\". He states that he doesn't hear AH but there are times he thinks people are trying to \"get rid of me\". At times theTV send him messages and he belives the conversations the actors are having are about him. He gets sporadic sleep. He states he will sleep for a few hours at a time. He tells me he wants to live now and wants to be dc'd so he can go back to work. i did tell him that since he made the threats that he would shoot  Himself, i would not be comfortable letting him go and would need to file a petition for commitment.        DIagnoses:         Bipolar disorder, most recent episode  Manic, severe with psychotic features  Schizoaffective disorder,bipolar type            Attestation:  Patient has been seen and evaluated by me, Nichelle Shah NP, in the presence of the house staff team          Interim History:   The patient's care was discussed with the treatment team and chart notes were reviewed.          Medications:     Prescription Medications as of 3/13/2017             Lurasidone HCl (LATUDA PO) Take 40 mg by mouth daily      Facility Administered Medications as of 3/13/2017             divalproex (DEPAKOTE) EC tablet 250 mg Take 1 tablet (250 mg) by mouth every 12 hours    ARIPiprazole (ABILIFY) ODT tab 10 mg Take 1 tablet (10 mg) by mouth daily    nicotine polacrilex (NICORETTE) gum 2-4 mg Place 1-2 each (2-4 mg) inside cheek every hour as needed for smoking cessation    diphenhydrAMINE (BENADRYL) capsule 25 mg Take 1 capsule (25 mg) by mouth every 4 hours as needed for itching or other (anxiety,EPS)    benztropine (COGENTIN) tablet 0.5 mg Take 1 tablet (0.5 " mg) by mouth 2 times daily as needed for agitation or other (eps)    hydrOXYzine (ATARAX) tablet 25-50 mg Take 1-2 tablets (25-50 mg) by mouth every 4 hours as needed for anxiety    acetaminophen (TYLENOL) tablet 650 mg Take 2 tablets (650 mg) by mouth every 4 hours as needed for mild pain    traZODone (DESYREL) tablet 50 mg Take 1 tablet (50 mg) by mouth nightly as needed for sleep    nicotine Patch in Place Place onto the skin every 8 hours    nicotine patch REMOVAL Place onto the skin daily    nicotine (NICODERM CQ) 21 MG/24HR 24 hr patch 1 patch Place 1 patch onto the skin daily                Allergies:   No Known Allergies         Psychiatric Examination:   /70  Pulse 101  Temp 97.7  F (36.5  C) (Tympanic)  Resp 14  SpO2 97%  Weight is 0 lbs 0 oz  There is no height or weight on file to calculate BMI.    Appearance:  awake, alert and dressed in hospital scrubs  Attitude:  guarded  Eye Contact:  fair  Mood:  anxious  Affect:  mood congruent  Speech:  clear, coherent  Psychomotor Behavior:  no evidence of tardive dyskinesia, dystonia, or tics and intact station, gait and muscle tone  Thought Process:  goal oriented  Associations:  no loose associations  Thought Content:  no evidence of suicidal ideation or homicidal ideation and patient appears to be responding to internal stimuli  Insight:  limited  Judgment:  limited  Oriented to:  time, person, and place  Attention Span and Concentration:  fair  Recent and Remote Memory:  fair  Fund of Knowledge: appropriate  Muscle Strength and Tone: normal  Gait and Station: Normal  Perception : pt remains paranoid and delusional         Labs:     Recent Results (from the past 24 hour(s))   TSH with free T4 reflex    Collection Time: 03/13/17  6:17 AM   Result Value Ref Range    TSH 1.18 0.40 - 4.00 mU/L             Assessment/ Plan:   Increase abilify and depakote.  Commitment will be filed

## 2017-03-13 NOTE — PLAN OF CARE
"Social Service Psychosocial Assessment  Presenting Problem:   Bandar is a 41 year-old, single,  male who was brought into the [place] ED.  According to admission notes, \"Pt is having suicidal ideation, thoughts to shoot himself. Pt is fixated on ways to kill himself in the er, asking staff \"do you have any cyanide?\" and is paranoid, constantly checking the door. Pt was seen in the [place] er over the weekend but was given ativan and dc'd home. Pt saw a psychiatrist and was started on 40mg latuda yesterday. Drug screen negative.\"  Marital Status: Single  Spouse / Children:  None  Psychiatric TX HX: This is pt's first in-pt hospitalization.  He was seen by a psychiatrist Shae Vanegas at [facility] last July and again by Dr. Joyce Nick at the [facility] this past week for paranoia and psychosis.    Suicide Risk Assessment: On admission pt reported having SI, today pt reported to staff that he plans to shoot himself when he discharges.  Pt denies any prior suicide attempts.  Access to Lethal Means (explain): Pt denies access to guns.   Family Psych HX:  Pt reported that his sister has anxiety disorder and did not leave her apartment for 4 or 5 months.   A & Ox:3  Medication Adherence: Unknown  Medical Issues:  See H & P  Visual  Motor Functioning:  No problems noted.   Communication Skills /Needs: Pt presents with pressured, disorganized, tangential speech.  Ethnicity:   White     Spirituality/Taoism Affiliation: Restorationist   Clergy Request: Yes   History:  None  Living Situation:  Pt was living with his parents but recently moved into his own apartment in [place].  ADL s: Independent  Education: Graduated high school.  Completed  program at a [employer].  Financial Situation: Is employed as a  for the past 3 months.  Occupation: Employer: [employer]  Leisure & Recreation:  Hunting and fishing.  Childhood History:  Pt grew up in " [place].  Has 1 older sister.    Trauma Abuse HX: Pt denies any childhood abuse.  Relationship / Sexuality: Pt denies any current relationship.  Substance Use/ Abuse:  Pt has a history of alcohol abuse. Pt denies any recent substance abuse.  Chemical Dependency Treatment HX:  Pt completed CD treatment in 2006 and 2009 following a DUI.  Legal Issues: History of a DUI conviction.  Denies any current legal issues.  Significant Life Events: Pt quit his job and moved in with his parents due to paranoia.    Strengths: Good family support system, willingness to accept help.  Challenges /Limitation:  Mental health symptoms: paranoia and delusions.   Patient Support Contact (Include name, relationship, number, and summary of conversation):     Interventions:       Medical/Dental Care: PCP: Edgar Powers [phone]    Medication Management: Joyce Nick    Insurance Coverage: On [employer]    Commit/Llamas Screening: Yes    Suicide Risk Assessment: On admission pt reported having SI, today pt reported to staff that he plans to shoot himself when he discharges.  Pt denies any prior suicide attempts.    High Risk Safety Plan: Talk to supports; Call crisis lines; Go to local ER if feeling suicidal.

## 2017-03-13 NOTE — PLAN OF CARE
Face to face end of shift report received from George HARRELL. Rounding completed. Patient observed in [facility].    Eugenia Dorado  3/13/2017  3:58 PM

## 2017-03-13 NOTE — PLAN OF CARE
Face to face end of shift report received from JULIO CESAR Phillip. Rounding completed. Patient observed in room.    George Germain  3/13/2017  7:49 AM

## 2017-03-13 NOTE — PLAN OF CARE
"Problem: Goal Outcome Summary  Goal: Goal Outcome Summary  He is weaning to the open unit and is attending groups. He talks of life never getting better and he \"should kill himself\". \"I will never get out of here, there is not point\".  He continues with paranoid thinking. He is hopeful at times as he talks about wanting to go back to work and likes to be outside. He has a flat affect. He repeats himself saying \"there is no hope\", \"I should shoot myself\". He is given much encouragement that things will get better.     Problem: Suicide Risk (Adult)  Goal: Strength-Based Wellness/Recovery  Patient will deny suicidal ideation by discharge.   He continues to have suicidal thinking. He says \"It's never going to be better, I should kill myself\". Then talks about wanting to get back to his job as he likes his job.   Goal: Physical Safety  Patient will be free from harming self and others while on the unit until discharge.   He is not acting on suicidal thoughts.    Problem: Additional Goals: Nursing Focus  Goal: 1. Patient Goal: Nursing Focus  Patient able to wean to open unit as long as his behavior remains in control. He will be compliant with safety check prior to returning to the MHICU.   He is weaning today. His behavior is in control although he continues to be quite paranoid.     Problem: Thought Process Alteration (Adult)  Goal: Improved Thought Process  Paranoid thoughts will resolve by discharge.   Continues to be quite paranoid      "

## 2017-03-14 LAB — DEPRECATED CALCIDIOL+CALCIFEROL SERPL-MC: 29 UG/L (ref 20–75)

## 2017-03-14 PROCEDURE — 25000132 ZZH RX MED GY IP 250 OP 250 PS 637: Performed by: NURSE PRACTITIONER

## 2017-03-14 PROCEDURE — 99232 SBSQ HOSP IP/OBS MODERATE 35: CPT | Performed by: NURSE PRACTITIONER

## 2017-03-14 PROCEDURE — 12400000 ZZH R&B MH

## 2017-03-14 RX ADMIN — ARIPIPRAZOLE 15 MG: 15 TABLET, ORALLY DISINTEGRATING ORAL at 08:16

## 2017-03-14 RX ADMIN — NICOTINE 1 PATCH: 21 PATCH, EXTENDED RELEASE TRANSDERMAL at 08:16

## 2017-03-14 RX ADMIN — NICOTINE POLACRILEX 4 MG: 2 GUM, CHEWING ORAL at 09:43

## 2017-03-14 RX ADMIN — DIVALPROEX SODIUM 750 MG: 500 TABLET, DELAYED RELEASE ORAL at 21:29

## 2017-03-14 RX ADMIN — DIVALPROEX SODIUM 500 MG: 500 TABLET, DELAYED RELEASE ORAL at 08:16

## 2017-03-14 NOTE — PLAN OF CARE
Face to face end of shift report received from George HARRELL. Rounding completed. Patient observed in group.    Eugenia Dorado  3/14/2017  3:35 PM

## 2017-03-14 NOTE — PLAN OF CARE
Face to face end of shift report received from JULIO CESAR Phillip. Rounding completed. Patient observed in room.     George Germain  3/14/2017  7:59 AM

## 2017-03-14 NOTE — PLAN OF CARE
"Problem: Goal Outcome Summary  Goal: Goal Outcome Summary  Patient is weaning to the open unit and is attending group. He has a flat affect and is preoccupied. He denies suicidal thinking but his affect and actions have not changed. He continues to be paranoid. He walks in the halls, works on word searches, and looks out the window. He talks of needing to get back to work. \"I took this week off without pay, I have to get back to work by next Monday\".      Problem: Suicide Risk (Adult)  Goal: Strength-Based Wellness/Recovery  Patient will deny suicidal ideation by discharge.   Currently denies having suicidal thoughts even when talked to about his comments about wanting to shoot himself and that he will never make it out alive. He states \"nope\". He is preoccupied and continues to ask the same questions over and over. \"can I go home?\", \"I need to get back to work\", \"Why can't I go home?\"   Goal: Physical Safety  Patient will be free from harming self and others while on the unit until discharge.   He is not acting on any thought of harming himself or others.     Problem: Additional Goals: Nursing Focus  Goal: 1. Patient Goal: Nursing Focus  Patient able to wean to open unit as long as his behavior remains in control. He will be compliant with safety check prior to returning to the MHICU.   He is weaning to the open unit and maintaining appropriate boundaries with staff and peers. He is compliant with safety checks when returning to the MHICU.      "

## 2017-03-14 NOTE — PROGRESS NOTES
Face to face end of shift report received from gopi griggs at 2300 report. Rounding completed. Patient observed.     Frida Grady  3/14/2017  12:43 AM        3

## 2017-03-14 NOTE — PLAN OF CARE
Problem: Goal Outcome Summary  Goal: Goal Outcome Summary  Patient was calm and cooperative with nurse assessment. Patient denies depression, SI, SIB, hallucinations, and delusions. Patient does reports anxiety about missing work and losing his job. Patient weaned all shift and is attending groups. Patient has a flat affect and walked the halls a lot and looked out the window.    Problem: Suicide Risk (Adult)  Goal: Strength-Based Wellness/Recovery  Patient will deny suicidal ideation by discharge.   Patient denies SI this shift.  Goal: Physical Safety  Patient will be free from harming self and others while on the unit until discharge.   Patient was free from harm on the unit this shift.    Problem: Additional Goals: Nursing Focus  Goal: 1. Patient Goal: Nursing Focus  Patient able to wean to open unit as long as his behavior remains in control. He will be compliant with safety check prior to returning to the MHICU.   Patient weaned this shift and was appropriate.    Problem: Thought Process Alteration (Adult)  Goal: Improved Thought Process  Paranoid thoughts will resolve by discharge.   Patient still remains paranoid.

## 2017-03-14 NOTE — PROGRESS NOTES
"[facility]  Psychiatric Progress Note    Subjective   This is my first time meeting michelle. He dneies any SI. He states he just wants to go back home and go to work by Monday. He is still very paranoid. He tells me that another patient was speakig about archery and he believes that it means he will be thrown in the woods and hunted with a bow and arrow and killed. He states he is not sure who wants to kill him but he can \"feel it in his heart\". He states when he was driving [address] he saw people flipping him off and people looking at him like they waned to kill him.  It is quesitonable if he has halluciantions though he does deny them       DIagnoses:         Unspecified psychotic disorder  Rule out delusional disorder, persecutory type    R/o Bipolar disorder, most recent episode  Manic, severe with psychotic features  R/o Schizoaffective disorder,bipolar type            Attestation:  Patient has been seen and evaluated by me, Nichelle Shah NP, in the presence of the house staff team          Interim History:   The patient's care was discussed with the treatment team and chart notes were reviewed.          Medications:     Prescription Medications as of 3/14/2017             Lurasidone HCl (LATUDA PO) Take 40 mg by mouth daily      Facility Administered Medications as of 3/14/2017             ARIPiprazole (ABILIFY) ODT tab 15 mg Take 1.5 tablets (15 mg) by mouth daily    divalproex (DEPAKOTE) EC tablet 500 mg Take 1 tablet (500 mg) by mouth every 12 hours    melatonin tablet 1 mg Take 1 tablet (1 mg) by mouth nightly as needed for sleep or Insomnia    nicotine polacrilex (NICORETTE) gum 2-4 mg Place 1-2 each (2-4 mg) inside cheek every hour as needed for smoking cessation    diphenhydrAMINE (BENADRYL) capsule 25 mg Take 1 capsule (25 mg) by mouth every 4 hours as needed for itching or other (anxiety,EPS)    benztropine (COGENTIN) tablet 0.5 mg Take 1 tablet (0.5 mg) by mouth 2 times daily as " needed for agitation or other (eps)    hydrOXYzine (ATARAX) tablet 25-50 mg Take 1-2 tablets (25-50 mg) by mouth every 4 hours as needed for anxiety    acetaminophen (TYLENOL) tablet 650 mg Take 2 tablets (650 mg) by mouth every 4 hours as needed for mild pain    nicotine Patch in Place Place onto the skin every 8 hours    nicotine patch REMOVAL Place onto the skin daily    nicotine (NICODERM CQ) 21 MG/24HR 24 hr patch 1 patch Place 1 patch onto the skin daily                Allergies:   No Known Allergies         Psychiatric Examination:   /84  Pulse 99  Temp 97  F (36.1  C) (Tympanic)  Resp 16  SpO2 98%  Weight is 0 lbs 0 oz  There is no height or weight on file to calculate BMI.    Appearance:  awake, alert and dressed in hospital scrubs  Attitude:  guarded  Eye Contact:  fair  Mood:  anxious  Affect:  mood congruent  Speech:  clear, coherent  Psychomotor Behavior:  no evidence of tardive dyskinesia, dystonia, or tics and intact station, gait and muscle tone  Thought Process:  goal oriented  Associations:  no loose associations  Thought Content:  no evidence of suicidal ideation or homicidal ideation and patient appears to be responding to internal stimuli   Insight:  limited  Judgment:  limited  Oriented to:  time, person, and place  Attention Span and Concentration:  fair  Recent and Remote Memory:  fair  Fund of Knowledge: appropriate  Muscle Strength and Tone: normal  Gait and Station: Normal  Perception : pt remains paranoid and delusional         Labs:     No results found for this or any previous visit (from the past 24 hour(s)).          Assessment/ Plan:   No changes in medications  Increase depakote

## 2017-03-14 NOTE — PLAN OF CARE
Problem: Goal Outcome Summary  Goal: Goal Outcome Summary  24172 in bed with easy respirations, eyes closed, presenting sleeping, in the mhicu, status 15 continues.0559 has slept most of this shift. No complaints.

## 2017-03-14 NOTE — PLAN OF CARE
"Problem: Discharge Planning  Goal: Discharge Planning (Adult, OB, Behavioral, Peds)  Writer called [facility] to confirm receipt of pre-petition screening request- they informed writer that they were not the responsible county and had sent it to [facility]. Writer called [facility] who informed writer that [facility] was the responsible county.  Writer called [facility]- [phone]- they did not have the request so writer faxed it to them- [phone].     11:17 am: Writer spoke with [facility] and was informed they are still investigating what county pt is the responsibility of.  Writer spoke with [facility] and they stated they also are trying to determine if pt is their responsibility; stated they would fax a form over to be filled out and returned.     12:12 Received a message from Milady Lee [phone] at [facility] asking to re-email the pre-petition paperwork because their fax machine is not sending or receiving.  Emailed to [email or web address]..     Received a call from Gregoria Gomez [phone] with [facility] asking about pt's address.  Writer spoke with pt and he stated the address on file is his parent's address.  He states he moved into his own apartment in [address] and he works at [employer]. Writer called back Gregoria and provided her with this information.     2 pm: Writer put in a request for a [employer]  to visit with the pt per pt's request.    3p: called back Milady Lee to find out the status of the pre-petition screening request- she stated that [facility] was taking pt and \"we are working on it right now.\"  "

## 2017-03-15 PROCEDURE — 12400011

## 2017-03-15 PROCEDURE — 25000132 ZZH RX MED GY IP 250 OP 250 PS 637: Performed by: NURSE PRACTITIONER

## 2017-03-15 PROCEDURE — 99232 SBSQ HOSP IP/OBS MODERATE 35: CPT | Performed by: NURSE PRACTITIONER

## 2017-03-15 PROCEDURE — 12400000 ZZH R&B MH

## 2017-03-15 RX ORDER — OLANZAPINE 5 MG/1
5 TABLET ORAL 3 TIMES DAILY PRN
Status: DISCONTINUED | OUTPATIENT
Start: 2017-03-15 | End: 2017-03-21 | Stop reason: HOSPADM

## 2017-03-15 RX ORDER — PALIPERIDONE 3 MG/1
3 TABLET, EXTENDED RELEASE ORAL DAILY
Status: DISCONTINUED | OUTPATIENT
Start: 2017-03-15 | End: 2017-03-16

## 2017-03-15 RX ADMIN — ARIPIPRAZOLE 15 MG: 15 TABLET, ORALLY DISINTEGRATING ORAL at 08:52

## 2017-03-15 RX ADMIN — NICOTINE POLACRILEX 4 MG: 2 GUM, CHEWING ORAL at 07:46

## 2017-03-15 RX ADMIN — NICOTINE 1 PATCH: 21 PATCH, EXTENDED RELEASE TRANSDERMAL at 08:51

## 2017-03-15 RX ADMIN — VALPROIC ACID 750 MG: 250 SOLUTION ORAL at 19:30

## 2017-03-15 RX ADMIN — OLANZAPINE 5 MG: 5 TABLET, FILM COATED ORAL at 09:33

## 2017-03-15 RX ADMIN — VALPROIC ACID 750 MG: 250 SOLUTION ORAL at 08:52

## 2017-03-15 RX ADMIN — PALIPERIDONE 3 MG: 3 TABLET, EXTENDED RELEASE ORAL at 12:57

## 2017-03-15 ASSESSMENT — ACTIVITIES OF DAILY LIVING (ADL)
DRESS: SCRUBS (BEHAVIORAL HEALTH);INDEPENDENT
GROOMING: INDEPENDENT
DRESS: SCRUBS (BEHAVIORAL HEALTH)
ORAL_HYGIENE: INDEPENDENT
GROOMING: INDEPENDENT
ORAL_HYGIENE: INDEPENDENT

## 2017-03-15 NOTE — PLAN OF CARE
"Problem: Discharge Planning  Goal: Discharge Planning (Adult, OB, Behavioral, Peds)  Had a family care meeting with pt's parents, April -provider and writer.  Pt's parents discussed the circumstances that led to pt's hospitalization.  Pt joined the meeting and we discussed if the pt would be willing to stay voluntarily until he is better then we could support that pt is voluntary in lieu of commitment.  Pt expressed he wants to leave to get back to work.  Pt's father assured him that he had spoken with the owner of the company and they will hold his job for him and he does not have to worry about loosing it.  Pt continues to have paranoia and stated that last night when he heard a helicopter outside he thought they were coming for him or that when he was just in group that \"the lady doing group told me to get another jacket because it is cold outside so I thought they are going to throw me outside to freeze.\"   Parents had the pt sign making them limited power of  and pt gave parents his money from the safe to put back into his bank account- over $5000 cash.       Writer gave pt information about the screening process that was faxed from [facility] and then called and left a message with Kaylee Ray [phone] letting her know that pt received the information.        "

## 2017-03-15 NOTE — PROGRESS NOTES
Face to face end of shift report received from gopi griggs at 2300 report.. Rounding completed. Patient observed.     Frida Grady  3/14/2017  11:46 PM

## 2017-03-15 NOTE — PLAN OF CARE
"Problem: Goal Outcome Summary  Goal: Goal Outcome Summary  Outcome: No Change  Admts to continued paranoia that people are after him but \"I can't run from everywhere I go\"       Problem: Suicide Risk (Adult)  Goal: Strength-Based Wellness/Recovery  Patient will attend 50% of groups while hospitalized.   Pt will verbalize a reduction or resolution in suicidal thoughts by discharge.   Outcome: Improving  Pt denies SI and states has been attending some grouops   Goal: Physical Safety  Patient will be free from harming self and others while on the unit until discharge.   Outcome: Improving  Pt has been free of harming self or others     Problem: Thought Process Alteration (Adult)  Goal: Improved Thought Process  Paranoid thoughts will resolve by discharge.   Outcome: No Change  Pt continues to believe that a girl that his coworkers tried to hook him up with in [facility] who was in a restaurant that he never did meet followed him to a new job   When talks about this girl admits it sounds ridiculous but fixed belief that it is real  States \"whats the chance that this girl would follow me to my new job and it feels like this is happening all over again \" Admits to being paranoid that people are \"after me\"   Pt cooperative  Denies depression or SI  Eating okay and states will go to group tonight  States has had nightmares and c/o new noises around here that make it hard to sleep      "

## 2017-03-15 NOTE — PLAN OF CARE
Problem: Goal Outcome Summary  Goal: Goal Outcome Summary  2330 in bed with easy respirations. 0556 patient has slept this shift. Status 15 intact.

## 2017-03-15 NOTE — PLAN OF CARE
Face to face end of shift report received from JULIO CESAR Galicia. Rounding completed. Patient observed in ICU lounge. Pt requesting to wean out to open unit. Pt is appropriate and was allowed to do so.     Renea Coles  3/15/2017  7:49 AM

## 2017-03-15 NOTE — PROGRESS NOTES
"[facility]  Psychiatric Progress Note    Subjective   Today we had a meeting with his mother and father.  We talked to him about staying here on a voluntary basis. He still believes that while he is on our psychiatric unit, summary will try to kill him.  He is still fearful.  It does not appear that the Abilify is working well.  He is not having any side effects from it. Today he and his parents signed temporary power of  over finances so they can help him pay his bills.  He did give them some of his cash from the [employer] to put into his bank account.  He states he is willing to stay here on a voluntary basis \"as long as it is not for too long\" .  has minimal insight into his mental illness           DIagnoses:         Unspecified psychotic disorder  Rule out delusional disorder, persecutory type    R/o Bipolar disorder, most recent episode  Manic, severe with psychotic features  R/o Schizoaffective disorder,bipolar type            Attestation:  Patient has been seen and evaluated by me, Nichelle Shah NP, in the presence of the house staff team          Interim History:   The patient's care was discussed with the treatment team and chart notes were reviewed.          Medications:     Prescription Medications as of 3/15/2017             Lurasidone HCl (LATUDA PO) Take 40 mg by mouth daily      Facility Administered Medications as of 3/15/2017             valproic acid (DEPAKENE) syrup 750 mg Take 15 mLs (750 mg) by mouth every 12 hours    OLANZapine (zyPREXA) tablet 5 mg Take 1 tablet (5 mg) by mouth 3 times daily as needed for aggression (paranoia)    paliperidone (INVEGA) 24 hr tablet 3 mg Take 1 tablet (3 mg) by mouth daily    divalproex (DEPAKOTE) EC tablet 750 mg (Discontinued) Take 750 mg by mouth every 12 hours    melatonin tablet 1 mg Take 1 tablet (1 mg) by mouth nightly as needed for sleep or Insomnia    ARIPiprazole (ABILIFY) ODT tab 15 mg (Discontinued) Take 1 tablet (15 mg) " by mouth daily    divalproex (DEPAKOTE) EC tablet 500 mg (Discontinued) Take 1 tablet (500 mg) by mouth every 12 hours    nicotine polacrilex (NICORETTE) gum 2-4 mg Place 1-2 each (2-4 mg) inside cheek every hour as needed for smoking cessation    diphenhydrAMINE (BENADRYL) capsule 25 mg Take 1 capsule (25 mg) by mouth every 4 hours as needed for itching or other (anxiety,EPS)    benztropine (COGENTIN) tablet 0.5 mg Take 1 tablet (0.5 mg) by mouth 2 times daily as needed for agitation or other (eps)    hydrOXYzine (ATARAX) tablet 25-50 mg Take 1-2 tablets (25-50 mg) by mouth every 4 hours as needed for anxiety    acetaminophen (TYLENOL) tablet 650 mg Take 2 tablets (650 mg) by mouth every 4 hours as needed for mild pain    nicotine Patch in Place Place onto the skin every 8 hours    nicotine patch REMOVAL Place onto the skin daily    nicotine (NICODERM CQ) 21 MG/24HR 24 hr patch 1 patch Place 1 patch onto the skin daily                Allergies:   No Known Allergies         Psychiatric Examination:   /80  Pulse 70  Temp 96.7  F (35.9  C) (Tympanic)  Resp 16  SpO2 97%  Weight is 0 lbs 0 oz  There is no height or weight on file to calculate BMI.    Appearance:  awake, alert and dressed in hospital scrubs  Attitude:  guarded  Eye Contact:  fair  Mood:  anxious  Affect:  mood congruent  Speech:  clear, coherent  Psychomotor Behavior:  no evidence of tardive dyskinesia, dystonia, or tics and intact station, gait and muscle tone  Thought Process:  goal oriented  Associations:  no loose associations  Thought Content:  no evidence of suicidal ideation or homicidal ideation and patient appears to be responding to internal stimuli   Insight:  limited  Judgment:  limited  Oriented to:  time, person, and place  Attention Span and Concentration:  fair  Recent and Remote Memory:  fair  Fund of Knowledge: appropriate  Muscle Strength and Tone: normal  Gait and Station: Normal  Perception : pt remains paranoid and  delusional         Labs:     No results found for this or any previous visit (from the past 24 hour(s)).          Assessment/ Plan:   Discontinue Abilify      Start invega 3 mg today and will increase to 6 mg tomorrow

## 2017-03-15 NOTE — PLAN OF CARE
Face to face end of shift report received from Renea HARRELL. Rounding completed. Patient observed.     Maurice Gil  3/15/2017  4:00 PM

## 2017-03-15 NOTE — PLAN OF CARE
"Problem: Goal Outcome Summary  Goal: Goal Outcome Summary  Outcome: No Change  Pt denies SI, HI. When asked if he is having hallucinations, pt states \"not really.\" Pt then appears to look around suspiciously. Pt does endorse \"a little\" anxiety. Denies depression and pain. Pt is pleasant and cooperative. Weans appropriately to open unit. Pt took AM meds with no difficulty. Pt does ask when he can leave, states that his hold was up last night. Pt was informed that NP had filed for commitment on him. Pt does not seem able to process this information. Pt was encouraged to speak with social work and NP about any questions he may have. Pt pacing around open unit. Did not eat breakfast, states that he is not hungry.     0933- Pt currently in group. Can be overheard stating \"They're going to kill me, I just know it.\" Pt was given PRN Zyprexa 5mg PO at this time per request of NP for anxiety/paranoia. Pt verbalizing \"All these meds are going to kill me.\" Pt again asking when he can leave. Pt was informed that NP had filed for commitment on him. Pt has many questions regarding this process. Asking, \"Does that mean I have to stay here for the rest of my life?\" Pt reassured that this was not so. Social work currently meeting with pt to explain commitment process.    1100- Pt's parents here to meet with SW and NP. Pt present as well.    1130- Pt moved to room 562 on the open unit. Pt wondering why he was moved, asking if he could stay in the room he was in previously. Pt was reassured that he was moved out of [facility] because he was improving.     1240- Pt was given his wallet so he could give cash to his parents. NP and SW were with pt at the time. Per SW, pt may possibly have taken a bank card from the wallet. Listed on belongings envelope as \"[employer] union bank cards.\" Only one federal credit union bank card was noted in wallet when returned to . Pt was asked to empty his pockets and did so. Pt denies having bank card " "in his possession. Will check pt's room.     1300- Pt's room was thoroughly checked by staff, no bank card was found. Pt took scheduled invega but was unable to swallow pill. Pt chewed pill. NP Nichelle Shah was updated, suggested calling pharmacy to see about an ODT or liquid form. Per pharmacy, no ODT or liquid form available for invega. Also, should not be crushed or chewed. Will advise oncoming shift to give pill in spoonful of applesauce or pudding. NP Nichelle Shah aware. Pt very worried about whether invega is safe to take \"with the pills I had earlier.\" Pt was reassured that it was. Pt also worried that he is allergic to med or that med will cause him to have a seizure. Pt was again reassured. Pt states that he will be leaving here in two weeks.     6003- UA informed this writer that while doing checks, pt was observed knocking on door for room 560. Pt was about to enter the room but UA intervened. Pt stated that he thought someone needed help in room 560. Pt reassured that this was not the case. Pt also educated that he is not allowed to enter other pt's rooms.    4351- Pt completed phone screening for commitment with [facility].     Problem: Suicide Risk (Adult)  Intervention: Promote/Enhance Psychosocial Well-being  Pt encouraged to verbalize his feelings. Emotional support provided.   Intervention: Assess Risk to Self/Maintain Safety  Pt denies SI.     Goal: Strength-Based Wellness/Recovery  Patient will deny suicidal ideation by discharge.   Outcome: No Change  Pt denies SI. Pt was encouraged to attend unit programming.   Goal: Physical Safety  Patient will be free from harming self and others while on the unit until discharge.   Outcome: No Change  Pt has remained free from harm this shift.     Problem: Additional Goals: Nursing Focus  Goal: 1. Patient Goal: Nursing Focus  Patient able to wean to open unit as long as his behavior remains in control. He will be compliant with safety check " prior to returning to the MHICU.   Outcome: No Change  Pt has been appropriate to wean this shift.     Problem: Thought Process Alteration (Adult)  Intervention: Optimize Communication  Pt alert and able to communicate with no difficulty.   Intervention: Provide Frequent Orientation/Reorientation  Pt alert, oriented to person and place.     Goal: Improved Thought Process  Paranoid thoughts will resolve by discharge.   Outcome: No Change  Pt continues to display paranoid behaviors. Eyes darting and looking around suspiciously.

## 2017-03-16 PROCEDURE — 12400000 ZZH R&B MH

## 2017-03-16 PROCEDURE — 12400011

## 2017-03-16 PROCEDURE — 25000132 ZZH RX MED GY IP 250 OP 250 PS 637: Performed by: NURSE PRACTITIONER

## 2017-03-16 PROCEDURE — 99232 SBSQ HOSP IP/OBS MODERATE 35: CPT | Performed by: NURSE PRACTITIONER

## 2017-03-16 RX ORDER — PALIPERIDONE 6 MG/1
6 TABLET, EXTENDED RELEASE ORAL DAILY
Status: DISCONTINUED | OUTPATIENT
Start: 2017-03-17 | End: 2017-03-21 | Stop reason: HOSPADM

## 2017-03-16 RX ADMIN — NICOTINE POLACRILEX 4 MG: 2 GUM, CHEWING ORAL at 09:38

## 2017-03-16 RX ADMIN — PALIPERIDONE 3 MG: 3 TABLET, EXTENDED RELEASE ORAL at 08:06

## 2017-03-16 RX ADMIN — VALPROIC ACID 750 MG: 250 SOLUTION ORAL at 08:06

## 2017-03-16 RX ADMIN — VALPROIC ACID 750 MG: 250 SOLUTION ORAL at 20:12

## 2017-03-16 RX ADMIN — NICOTINE 1 PATCH: 21 PATCH, EXTENDED RELEASE TRANSDERMAL at 08:05

## 2017-03-16 ASSESSMENT — ACTIVITIES OF DAILY LIVING (ADL)
GROOMING: INDEPENDENT
LAUNDRY: UNABLE TO COMPLETE
ORAL_HYGIENE: INDEPENDENT
DRESS: SCRUBS (BEHAVIORAL HEALTH);INDEPENDENT

## 2017-03-16 NOTE — PROGRESS NOTES
Face to face end of shift report received from renita rn at 2300 report.. Rounding completed. Patient observed.     Frida Grady  3/16/2017  1:16 AM

## 2017-03-16 NOTE — PLAN OF CARE
Problem: Discharge Planning  Goal: Discharge Planning (Adult, OB, Behavioral, Peds)  Writer called and spoke with Kaylee Jason [phone] who stated that pt agreed to stay and sign Voluntary in Lieu of Commitment and received outpatient services upon discharge.  She requested to be notified when he is discharged so she can set him up with [facility] and a - probably her to follow him for a few months.    Writer met with pt and explained what Vol. In Lieu of Commitment meant.  Pt is agreeable to stay voluntarily until practitioner determines he is stable to return home.  He is also agreeable to going to the [facility] program which is a 1 day a week outpt treatment and working with a .

## 2017-03-16 NOTE — PLAN OF CARE
"Problem: Goal Outcome Summary  Goal: Goal Outcome Summary  Patient cooperative with nursing assessment. States he didn't sleep well do to new room but otherwise is doing well. Denies anxiety, depression, SI, HI, pain and hallucinations. Agrees to contact staff if having thoughts of self harm. Does not appear to be responding to internal stimuli this shift. Patient up and participating in groups and socializing with peers throughout shift. Affect appears bright. Patient reporting having trouble with swallowing AM Invega but did get the medication down. No other questions or concerns at this time per patient. Patient did report to [facility] that he \"ran away to meet someone rich and famous, but I don't know who or where\". Will continue to monitor.     Problem: Suicide Risk (Adult)  Goal: Strength-Based Wellness/Recovery  Patient will attend 50% of groups while hospitalized.   Pt will verbalize a reduction or resolution in suicidal thoughts by discharge.   Outcome: Improving  Attended several groups this shift.   Denies SI. Agrees to contact staff if having thoughts of self harm.   Goal: Physical Safety  Patient will be free from harming self and others while on the unit until discharge.   Outcome: Improving  Free from self harm this shift.     Problem: Thought Process Alteration (Adult)  Goal: Improved Thought Process  Paranoid thoughts will resolve by discharge.   Outcome: No Change  Patient did not make any paranoid statements to this writer this shift.       "

## 2017-03-16 NOTE — PLAN OF CARE
Problem: Goal Outcome Summary  Goal: Goal Outcome Summary  0015 in bed with easy respirations, presenting sleeping. 0510 patient continues to sleep this shift.

## 2017-03-16 NOTE — PLAN OF CARE
Face to face end of shift report received from Genevieve BRYANT RN. Rounding completed. Patient observed participating in group. No requests at this time.     Jody Cao  3/16/2017  3:49 PM

## 2017-03-16 NOTE — PLAN OF CARE
Face to face end of shift report received from Frida CASE RN. Rounding completed. Patient observed in [facility].     Genevieve Swain  3/16/2017  7:39 AM

## 2017-03-16 NOTE — PLAN OF CARE
Problem: Goal Outcome Summary  Goal: Goal Outcome Summary  Outcome: Improving  Patient friendly and cooperative with assessment. Denies all criteria this shift. Denies any bowel/bladder problems. Bright affect, clear speech. Holding reality based conversations and joking with this writer. In day room with peers, reading and attending groups this shift. This writer did not observe patient responding to internal stimuli this shift. Given information about scheduled medications per request.  Ate 100% of dinner. In bed resting for remainder of shift by 2100.     Problem: Suicide Risk (Adult)  Goal: Strength-Based Wellness/Recovery  Patient will attend 50% of groups while hospitalized.   Pt will verbalize a reduction or resolution in suicidal thoughts by discharge.   Outcome: Improving  See above note.   Goal: Physical Safety  Patient will be free from harming self and others while on the unit until discharge.   Outcome: Improving  No concerns at this time.     Problem: Thought Process Alteration (Adult)  Goal: Improved Thought Process  Paranoid thoughts will resolve by discharge.   Outcome: Improving  No concerns at this time.

## 2017-03-16 NOTE — PROGRESS NOTES
[facility]  Psychiatric Progress Note    Subjective   This is a 41 year old male being who has a significant past psychiatric history of paranoia and psychosis.    He reports that he is doing better today. He feels that the Invega is helping more than the Abilify and is pleased with this change. He is in agreement to increase the dose. He reports feeling less paranoid today. He does state that once during the night he woke up and heard a helicopter and thought that someone was after him, though was able to identify this as a false belief. He states that he is willing to stay to get stabilized on medications before being discharged.         DIagnoses:         Unspecified psychotic disorder  Rule out delusional disorder, persecutory type    R/o Bipolar disorder, most recent episode  Manic, severe with psychotic features  R/o Schizoaffective disorder,bipolar type            Attestation:  Patient has been seen and evaluated by me, Keily Luo NP          Interim History:   The patient's care was discussed with the treatment team and chart notes were reviewed.          Medications:     Prescription Medications as of 3/16/2017             Lurasidone HCl (LATUDA PO) Take 40 mg by mouth daily      Facility Administered Medications as of 3/16/2017             paliperidone (INVEGA) 24 hr tablet 6 mg Starting on 3/17/2017. Take 1 tablet (6 mg) by mouth daily    valproic acid (DEPAKENE) syrup 750 mg Take 15 mLs (750 mg) by mouth every 12 hours    OLANZapine (zyPREXA) tablet 5 mg Take 1 tablet (5 mg) by mouth 3 times daily as needed for aggression (paranoia)    paliperidone (INVEGA) 24 hr tablet 3 mg (Discontinued) Take 1 tablet (3 mg) by mouth daily    melatonin tablet 1 mg Take 1 tablet (1 mg) by mouth nightly as needed for sleep or Insomnia    nicotine polacrilex (NICORETTE) gum 2-4 mg Place 1-2 each (2-4 mg) inside cheek every hour as needed for smoking cessation    diphenhydrAMINE (BENADRYL) capsule 25 mg  Take 1 capsule (25 mg) by mouth every 4 hours as needed for itching or other (anxiety,EPS)    benztropine (COGENTIN) tablet 0.5 mg Take 1 tablet (0.5 mg) by mouth 2 times daily as needed for agitation or other (eps)    hydrOXYzine (ATARAX) tablet 25-50 mg Take 1-2 tablets (25-50 mg) by mouth every 4 hours as needed for anxiety    acetaminophen (TYLENOL) tablet 650 mg Take 2 tablets (650 mg) by mouth every 4 hours as needed for mild pain    nicotine Patch in Place Place onto the skin every 8 hours    nicotine patch REMOVAL Place onto the skin daily    nicotine (NICODERM CQ) 21 MG/24HR 24 hr patch 1 patch Place 1 patch onto the skin daily                Allergies:   No Known Allergies         Psychiatric Examination:   /70  Pulse 100  Temp 97  F (36.1  C) (Tympanic)  Resp 16  SpO2 97%  Weight is 0 lbs 0 oz  There is no height or weight on file to calculate BMI.    Appearance:  awake, alert and dressed in hospital scrubs  Attitude:  guarded  Eye Contact:  fair  Mood:  anxious  Affect:  mood congruent  Speech:  clear, coherent  Psychomotor Behavior:  no evidence of tardive dyskinesia, dystonia, or tics and intact station, gait and muscle tone  Thought Process:  goal oriented  Associations:  no loose associations  Thought Content:  Denies any suicidal or homicidal ideation  Insight:  limited  Judgment:  limited  Oriented to:  time, person, and place  Attention Span and Concentration:  fair  Recent and Remote Memory:  fair  Fund of Knowledge: appropriate  Muscle Strength and Tone: normal  Gait and Station: Normal  Perception : pt remains paranoid and delusional         Labs:     No results found for this or any previous visit (from the past 24 hour(s)).          Assessment/ Plan:   Increase Invega to 6mg daily  Continue other medications

## 2017-03-17 PROCEDURE — 12400000 ZZH R&B MH

## 2017-03-17 PROCEDURE — 25000132 ZZH RX MED GY IP 250 OP 250 PS 637: Performed by: NURSE PRACTITIONER

## 2017-03-17 PROCEDURE — 99232 SBSQ HOSP IP/OBS MODERATE 35: CPT | Performed by: NURSE PRACTITIONER

## 2017-03-17 PROCEDURE — 12400011

## 2017-03-17 RX ADMIN — VALPROIC ACID 750 MG: 250 SOLUTION ORAL at 08:19

## 2017-03-17 RX ADMIN — VALPROIC ACID 750 MG: 250 SOLUTION ORAL at 20:32

## 2017-03-17 RX ADMIN — PALIPERIDONE 6 MG: 6 TABLET, EXTENDED RELEASE ORAL at 08:20

## 2017-03-17 RX ADMIN — NICOTINE POLACRILEX 4 MG: 2 GUM, CHEWING ORAL at 09:41

## 2017-03-17 RX ADMIN — NICOTINE 1 PATCH: 21 PATCH, EXTENDED RELEASE TRANSDERMAL at 08:19

## 2017-03-17 ASSESSMENT — ACTIVITIES OF DAILY LIVING (ADL)
LAUNDRY: UNABLE TO COMPLETE
DRESS: SCRUBS (BEHAVIORAL HEALTH);INDEPENDENT
GROOMING: SHOWER
GROOMING: INDEPENDENT
ORAL_HYGIENE: INDEPENDENT

## 2017-03-17 NOTE — PROGRESS NOTES
Face to face end of shift report received from herman clay rn at 2300 report.. Rounding completed. Patient observed.     Frida Grady  3/17/2017  1:24 AM

## 2017-03-17 NOTE — PLAN OF CARE
Problem: Goal Outcome Summary  Goal: Goal Outcome Summary  Patient cooperative with nursing assessment. Affect appears bright. States he slept much better last night than the previous night. Denies anxiety, depression, SI, HI, pain, and hallucinations. Agrees to contact staff if having thoughts of self harm. Does not appear to be responding to internal stimuli. Patient attended groups and socialized in lounge throughout shift. Expressed difficulty again about the ability to swallow his medication. NP were update. Patient stated it is easier to swallow medication with food so he was given a oatmeal cookie out of his belongings and was able to swallow AM Invega. Will continue to monitor.     Problem: Suicide Risk (Adult)  Goal: Strength-Based Wellness/Recovery  Patient will attend 50% of groups while hospitalized.   Pt will verbalize a reduction or resolution in suicidal thoughts by discharge.   Outcome: Improving  Attended several groups this shift.   Denies SI. Agrees to contact staff if having thoughts of self harm.  Goal: Physical Safety  Patient will be free from harming self and others while on the unit until discharge.   Outcome: Improving  Patient free from self harm this shift.     Problem: Thought Process Alteration (Adult)  Goal: Improved Thought Process  Paranoid thoughts will resolve by discharge.   Outcome: No Change  Patient made no paranoid statements to this writer.

## 2017-03-17 NOTE — PROGRESS NOTES
[facility]  Psychiatric Progress Note    Subjective   This is a 41 year old male being who has a significant past psychiatric history of paranoia and psychosis.  Bandar continues to report that he is feeling better. He denies any paranoia or hallucinations today. He slept well last night. He feels that the Invega has been very helpful so far. He does report having a hard time swallowing pills. We discussed having him eventually switch over to Invega Sustenna CUTLER, which he is agreeable to. He feels comfortable staying longer for further stabilization. He has been attending groups. He does not appear to be preoccupied or to be responding to internal stimuli.         DIagnoses:         Unspecified psychotic disorder  Rule out delusional disorder, persecutory type    R/o Bipolar disorder, most recent episode  Manic, severe with psychotic features  R/o Schizoaffective disorder,bipolar type            Attestation:  Patient has been seen and evaluated by me, Keily Luo NP          Interim History:   The patient's care was discussed with the treatment team and chart notes were reviewed.          Medications:     Prescription Medications as of 3/17/2017             Lurasidone HCl (LATUDA PO) Take 40 mg by mouth daily      Facility Administered Medications as of 3/17/2017             paliperidone (INVEGA) 24 hr tablet 6 mg Take 1 tablet (6 mg) by mouth daily    valproic acid (DEPAKENE) syrup 750 mg Take 15 mLs (750 mg) by mouth every 12 hours    OLANZapine (zyPREXA) tablet 5 mg Take 1 tablet (5 mg) by mouth 3 times daily as needed for aggression (paranoia)    paliperidone (INVEGA) 24 hr tablet 3 mg (Discontinued) Take 1 tablet (3 mg) by mouth daily    melatonin tablet 1 mg Take 1 tablet (1 mg) by mouth nightly as needed for sleep or Insomnia    nicotine polacrilex (NICORETTE) gum 2-4 mg Place 1-2 each (2-4 mg) inside cheek every hour as needed for smoking cessation    diphenhydrAMINE (BENADRYL) capsule 25 mg  Take 1 capsule (25 mg) by mouth every 4 hours as needed for itching or other (anxiety,EPS)    benztropine (COGENTIN) tablet 0.5 mg Take 1 tablet (0.5 mg) by mouth 2 times daily as needed for agitation or other (eps)    hydrOXYzine (ATARAX) tablet 25-50 mg Take 1-2 tablets (25-50 mg) by mouth every 4 hours as needed for anxiety    acetaminophen (TYLENOL) tablet 650 mg Take 2 tablets (650 mg) by mouth every 4 hours as needed for mild pain    nicotine Patch in Place Place onto the skin every 8 hours    nicotine patch REMOVAL Place onto the skin daily    nicotine (NICODERM CQ) 21 MG/24HR 24 hr patch 1 patch Place 1 patch onto the skin daily                Allergies:   No Known Allergies         Psychiatric Examination:   /70  Pulse 98  Temp 96.8  F (36  C) (Tympanic)  Resp 16  SpO2 99%  Weight is 0 lbs 0 oz  There is no height or weight on file to calculate BMI.    Appearance:  awake, alert and dressed in hospital scrubs  Attitude:  guarded  Eye Contact:  fair  Mood:  improved  Affect:  appropriate  Speech:  clear, coherent  Psychomotor Behavior:  no evidence of tardive dyskinesia, dystonia, or tics and intact station, gait and muscle tone  Thought Process:  goal oriented  Associations:  no loose associations  Thought Content:  Denies any suicidal or homicidal ideation  Insight:  limited  Judgment:  limited  Oriented to:  time, person, and place  Attention Span and Concentration:  fair  Recent and Remote Memory:  fair  Fund of Knowledge: appropriate  Muscle Strength and Tone: normal  Gait and Station: Normal  Perception : clearer         Labs:     No results found for this or any previous visit (from the past 24 hour(s)).          Assessment/ Plan:   Continue current medications  Discussed potentially transitioning to Invega Sustenna sometime next week

## 2017-03-17 NOTE — PLAN OF CARE
Milady (mother) called. This writer gave brief update on patient. She states that patients cousin Cecilio will be coming [place] from [place] either tonight or tomorrow night during visiting hours. Mother wished us to not tell Bandar in case Cecilio is unable to make it.

## 2017-03-17 NOTE — PLAN OF CARE
Face to face end of shift report received from Frida CASE RN. Rounding completed. Patient observed sleeping. Unlabored respirations.     Genevieve Swain  3/17/2017  7:31 AM

## 2017-03-17 NOTE — PLAN OF CARE
Problem: Goal Outcome Summary  Goal: Goal Outcome Summary  0100 in bed with easy respirations presenting sleeping.  0511 patient continues to sleep this shift.

## 2017-03-17 NOTE — PLAN OF CARE
Problem: Goal Outcome Summary  Goal: Goal Outcome Summary  Outcome: No Change  Friendly and cooperative with assessment. Denies all criteria this shift. Bright affect and joking with this writer again this shift. In day room with peers and attending groups. Showered before dinner this shift. Patient's cousin to visit this evening, observed laughing and socializing appropriately. This writer did not observe patient responding to internal stimuli this shift, holding reality based conversations. Playing cards with staff and peers in day room towards end of shift.     Problem: Suicide Risk (Adult)  Goal: Strength-Based Wellness/Recovery  Patient will attend 50% of groups while hospitalized.   Pt will verbalize a reduction or resolution in suicidal thoughts by discharge.   Outcome: Improving  No concerns at this time.   Goal: Physical Safety  Patient will be free from harming self and others while on the unit until discharge.   Outcome: Improving  No concerns at this time.     Problem: Thought Process Alteration (Adult)  Goal: Improved Thought Process  Paranoid thoughts will resolve by discharge.   Outcome: Improving  No concerns at this time.

## 2017-03-17 NOTE — PLAN OF CARE
Face to face end of shift report received from Genevieve BRYANT RN. Rounding completed. Patient observed participating in group at this time.     Jody Cao  3/17/2017  4:06 PM

## 2017-03-18 PROCEDURE — 25000132 ZZH RX MED GY IP 250 OP 250 PS 637: Performed by: NURSE PRACTITIONER

## 2017-03-18 PROCEDURE — 99232 SBSQ HOSP IP/OBS MODERATE 35: CPT | Performed by: NURSE PRACTITIONER

## 2017-03-18 PROCEDURE — 12400000 ZZH R&B MH

## 2017-03-18 PROCEDURE — 12400011

## 2017-03-18 RX ADMIN — VALPROIC ACID 750 MG: 250 SOLUTION ORAL at 19:39

## 2017-03-18 RX ADMIN — VALPROIC ACID 750 MG: 250 SOLUTION ORAL at 08:03

## 2017-03-18 RX ADMIN — PALIPERIDONE 6 MG: 6 TABLET, EXTENDED RELEASE ORAL at 08:03

## 2017-03-18 RX ADMIN — NICOTINE 1 PATCH: 21 PATCH, EXTENDED RELEASE TRANSDERMAL at 08:04

## 2017-03-18 RX ADMIN — NICOTINE POLACRILEX 4 MG: 2 GUM, CHEWING ORAL at 15:24

## 2017-03-18 ASSESSMENT — ACTIVITIES OF DAILY LIVING (ADL)
GROOMING: INDEPENDENT
ORAL_HYGIENE: INDEPENDENT
DRESS: SCRUBS (BEHAVIORAL HEALTH);INDEPENDENT
DRESS: SCRUBS (BEHAVIORAL HEALTH);INDEPENDENT
ORAL_HYGIENE: INDEPENDENT
GROOMING: INDEPENDENT

## 2017-03-18 NOTE — PLAN OF CARE
Problem: Goal Outcome Summary  Goal: Goal Outcome Summary  Outcome: No Change  Pt appeared to be sleeping through the night without any issues. Regular respirations noted, self repositioning noted. Will continue to monitor.

## 2017-03-18 NOTE — PROGRESS NOTES
"[facility]  Psychiatric Progress Note    Subjective   This is a 41 year old male being who has a significant past psychiatric history of paranoia and psychosis.    Bandar states that he is feeling good today. He states that he has not had any \"fearful thoughts\" about people being after him. He is not noted to be paranoid or preoccupied. He has been attending groups and behavior has been appropriate. He has been taking his morning Invega with food due to having trouble swallowing pills and this seems to have helped. He states that he feels ready for discharge so that he can get back to his job.          DIagnoses:         Unspecified psychotic disorder  Rule out delusional disorder, persecutory type    R/o Bipolar disorder, most recent episode  Manic, severe with psychotic features  R/o Schizoaffective disorder,bipolar type            Attestation:  Patient has been seen and evaluated by me, Keily Luo NP          Interim History:   The patient's care was discussed with the treatment team and chart notes were reviewed.          Medications:     Prescription Medications as of 3/18/2017             Lurasidone HCl (LATUDA PO) Take 40 mg by mouth daily      Facility Administered Medications as of 3/18/2017             paliperidone (INVEGA) 24 hr tablet 6 mg Take 1 tablet (6 mg) by mouth daily    valproic acid (DEPAKENE) syrup 750 mg Take 15 mLs (750 mg) by mouth every 12 hours    OLANZapine (zyPREXA) tablet 5 mg Take 1 tablet (5 mg) by mouth 3 times daily as needed for aggression (paranoia)    melatonin tablet 1 mg Take 1 tablet (1 mg) by mouth nightly as needed for sleep or Insomnia    nicotine polacrilex (NICORETTE) gum 2-4 mg Place 1-2 each (2-4 mg) inside cheek every hour as needed for smoking cessation    diphenhydrAMINE (BENADRYL) capsule 25 mg Take 1 capsule (25 mg) by mouth every 4 hours as needed for itching or other (anxiety,EPS)    benztropine (COGENTIN) tablet 0.5 mg Take 1 tablet (0.5 mg) by " mouth 2 times daily as needed for agitation or other (eps)    hydrOXYzine (ATARAX) tablet 25-50 mg Take 1-2 tablets (25-50 mg) by mouth every 4 hours as needed for anxiety    acetaminophen (TYLENOL) tablet 650 mg Take 2 tablets (650 mg) by mouth every 4 hours as needed for mild pain    nicotine Patch in Place Place onto the skin every 8 hours    nicotine patch REMOVAL Place onto the skin daily    nicotine (NICODERM CQ) 21 MG/24HR 24 hr patch 1 patch Place 1 patch onto the skin daily                Allergies:   No Known Allergies         Psychiatric Examination:   /64  Pulse 86  Temp 97.6  F (36.4  C) (Tympanic)  Resp 16  SpO2 97%  Weight is 0 lbs 0 oz  There is no height or weight on file to calculate BMI.    Appearance:  awake, alert and dressed in hospital scrubs  Attitude:  guarded  Eye Contact:  fair  Mood:  improved  Affect:  appropriate  Speech:  clear, coherent  Psychomotor Behavior:  no evidence of tardive dyskinesia, dystonia, or tics and intact station, gait and muscle tone  Thought Process:  goal oriented  Associations:  no loose associations  Thought Content:  Denies any suicidal or homicidal ideation  Insight:  limited  Judgment:  limited  Oriented to:  time, person, and place  Attention Span and Concentration:  fair  Recent and Remote Memory:  fair  Fund of Knowledge: appropriate  Muscle Strength and Tone: normal  Gait and Station: Normal  Perception : clearer         Labs:     No results found for this or any previous visit (from the past 24 hour(s)).          Assessment/ Plan:   Continue current medications  Discussed potentially transitioning to Invega Sustenna sometime next week

## 2017-03-18 NOTE — PLAN OF CARE
Face to face end of shift report received from Jody LOPEZ RN. Rounding completed. Patient observed resting in bed.     Jhony Bocanegra  3/17/2017  11:38 PM

## 2017-03-18 NOTE — PLAN OF CARE
Face to face end of shift report received from Jhony BLACK RN. Rounding completed. Patient observed in [facility].     Jane Ricks  3/18/2017  7:24 AM

## 2017-03-19 PROCEDURE — 12400011

## 2017-03-19 PROCEDURE — 25000132 ZZH RX MED GY IP 250 OP 250 PS 637: Performed by: NURSE PRACTITIONER

## 2017-03-19 PROCEDURE — 99232 SBSQ HOSP IP/OBS MODERATE 35: CPT | Performed by: NURSE PRACTITIONER

## 2017-03-19 PROCEDURE — 12400000 ZZH R&B MH

## 2017-03-19 RX ADMIN — VALPROIC ACID 750 MG: 250 SOLUTION ORAL at 20:20

## 2017-03-19 RX ADMIN — NICOTINE POLACRILEX 4 MG: 2 GUM, CHEWING ORAL at 08:38

## 2017-03-19 RX ADMIN — VALPROIC ACID 750 MG: 250 SOLUTION ORAL at 08:09

## 2017-03-19 RX ADMIN — PALIPERIDONE 6 MG: 6 TABLET, EXTENDED RELEASE ORAL at 08:07

## 2017-03-19 RX ADMIN — NICOTINE 1 PATCH: 21 PATCH, EXTENDED RELEASE TRANSDERMAL at 08:09

## 2017-03-19 ASSESSMENT — ACTIVITIES OF DAILY LIVING (ADL)
ORAL_HYGIENE: INDEPENDENT
DRESS: SCRUBS (BEHAVIORAL HEALTH);INDEPENDENT
GROOMING: INDEPENDENT

## 2017-03-19 NOTE — PLAN OF CARE
Face to face end of shift report received from Jhony BLACK RN. Rounding completed. Patient observed in [facility] [place].     Jane Ricks  3/19/2017  7:23 AM

## 2017-03-19 NOTE — PLAN OF CARE
"Problem: Goal Outcome Summary  Goal: Goal Outcome Summary  He is up on the unit and is social with others. His affect is bright and animated. He denies feeling depressed or suicidal. He is hopeful about going home soon as he states \"I really need to get back to work\", \"I am thankful that I came here, I really needed it\". He talks about attending most of the groups and has found them quite helpful. He continues to talk about the situation that occurred prior to coming to the hospital and still feels that people were not treating him right.  He talks about being better equipped to handle that situation and not let it bother or consume him. He denies any medication side effects and feels that the medications are \"really helping\".     Problem: Suicide Risk (Adult)  Goal: Strength-Based Wellness/Recovery  Patient will attend 50% of groups while hospitalized.   Pt will verbalize a reduction or resolution in suicidal thoughts by discharge.   He is attending some groups but not all as he doesn't feel that watching movies it what he needs.   Goal: Physical Safety  Patient will be free from harming self and others while on the unit until discharge.   He is not having any thoughts to harm himself and is not harming himself.     Problem: Thought Process Alteration (Adult)  Goal: Improved Thought Process  Paranoid thoughts will resolve by discharge.   He is not making any paranoid statements. He does talk of the issues that were occurring at work that followed him from his previous job are real but he is better equipped to handle it and not let him bother him.      "

## 2017-03-19 NOTE — PLAN OF CARE
Face to face end of shift report received from Jane DRISCOLL RN. Rounding completed. Patient observed resting in bed.     Jhony Bocanegra  3/18/2017  11:39 PM

## 2017-03-19 NOTE — PROGRESS NOTES
[facility]  Psychiatric Progress Note    Subjective   This is a 41 year old male being who has a significant past psychiatric history of paranoia and psychosis.    Bandar continues to report that he is doing well. There has been minimal paranoia or delusional thought since being started on the Invega, and today he states that he has not experienced any paranoia in the last 2 days. He has been tolerating this medication well without side effects. He is showing some insight regarding his presentation prior to admission and verbalizes the importance of remaining medication compliant. He reports feeling ready to get back to work.          DIagnoses:         Unspecified psychotic disorder  Rule out delusional disorder, persecutory type    R/o Bipolar disorder, most recent episode  Manic, severe with psychotic features  R/o Schizoaffective disorder,bipolar type            Attestation:  Patient has been seen and evaluated by me, Keily Luo NP          Interim History:   The patient's care was discussed with the treatment team and chart notes were reviewed.          Medications:     Prescription Medications as of 3/19/2017             Lurasidone HCl (LATUDA PO) Take 40 mg by mouth daily      Facility Administered Medications as of 3/19/2017             paliperidone (INVEGA) 24 hr tablet 6 mg Take 1 tablet (6 mg) by mouth daily    valproic acid (DEPAKENE) syrup 750 mg Take 15 mLs (750 mg) by mouth every 12 hours    OLANZapine (zyPREXA) tablet 5 mg Take 1 tablet (5 mg) by mouth 3 times daily as needed for aggression (paranoia)    melatonin tablet 1 mg Take 1 tablet (1 mg) by mouth nightly as needed for sleep or Insomnia    nicotine polacrilex (NICORETTE) gum 2-4 mg Place 1-2 each (2-4 mg) inside cheek every hour as needed for smoking cessation    diphenhydrAMINE (BENADRYL) capsule 25 mg Take 1 capsule (25 mg) by mouth every 4 hours as needed for itching or other (anxiety,EPS)    benztropine (COGENTIN) tablet  0.5 mg Take 1 tablet (0.5 mg) by mouth 2 times daily as needed for agitation or other (eps)    hydrOXYzine (ATARAX) tablet 25-50 mg Take 1-2 tablets (25-50 mg) by mouth every 4 hours as needed for anxiety    acetaminophen (TYLENOL) tablet 650 mg Take 2 tablets (650 mg) by mouth every 4 hours as needed for mild pain    nicotine Patch in Place Place onto the skin every 8 hours    nicotine patch REMOVAL Place onto the skin daily    nicotine (NICODERM CQ) 21 MG/24HR 24 hr patch 1 patch Place 1 patch onto the skin daily                Allergies:   No Known Allergies         Psychiatric Examination:   BP 92/60  Pulse 103  Temp 97.1  F (36.2  C) (Tympanic)  Resp 14  Wt 95.3 kg (210 lb 1.6 oz)  SpO2 99%  BMI 27.72 kg/m2  Weight is 210 lbs 1.57 oz  Body mass index is 27.72 kg/(m^2).    Appearance:  awake, alert and dressed in hospital scrubs  Attitude:  improved  Eye Contact:  fair  Mood:  improved  Affect:  appropriate  Speech:  clear, coherent  Psychomotor Behavior:  no evidence of tardive dyskinesia, dystonia, or tics and intact station, gait and muscle tone  Thought Process:  goal oriented  Associations:  no loose associations  Thought Content:  Denies any suicidal or homicidal ideation  Insight:  limited  Judgment:  limited  Oriented to:  time, person, and place  Attention Span and Concentration:  fair  Recent and Remote Memory:  fair  Fund of Knowledge: appropriate  Muscle Strength and Tone: normal  Gait and Station: Normal  Perception : clearer         Labs:     No results found for this or any previous visit (from the past 24 hour(s)).          Assessment/ Plan:   Continue current medications  Check valproic acid level, CMP, and CBC in AM

## 2017-03-19 NOTE — PLAN OF CARE
Face to face end of shift report received from JULIO CESAR Sykes. Rounding completed. Patient observed in [facility].    George Germain  3/19/2017  4:02 PM

## 2017-03-19 NOTE — PLAN OF CARE
Problem: Goal Outcome Summary  Goal: Goal Outcome Summary  Outcome: No Change  Pt appeared to be sleeping through the night without any issues noted. Regular respirations and self repositioning noted. Will continue to monitor.

## 2017-03-19 NOTE — PLAN OF CARE
Problem: Goal Outcome Summary  Goal: Goal Outcome Summary  Outcome: Improving  Pt denies HI SI SIB hallucinations anxiety depression and pain. Pt pleasant and cooperative with assessments. Pt took meds with food again this morning, pt reports trouble swallowing pills. Pt attending groups. Pt less paranoid today.     Problem: Suicide Risk (Adult)  Goal: Identify Related Risk Factors and Signs and Symptoms  Patient will identify risk factors and triggers to suicidal ideations by discharge.   Outcome: No Change  Pt working towards learning triggers and risk factors.   Goal: Strength-Based Wellness/Recovery  Patient will attend 50% of groups while hospitalized.   Pt will verbalize a reduction or resolution in suicidal thoughts by discharge.   Outcome: Improving  Pt is attending and participating in groups sessions.   Pt denies SI HI SIB  Goal: Physical Safety  Patient will be free from harming self and others while on the unit until discharge.   Outcome: Improving  Pt has been free from self harm and harm to others    Problem: Thought Process Alteration (Adult)  Goal: Improved Thought Process  Paranoid thoughts will resolve by discharge.   Outcome: Improving  Pt has had a decrease in  Paranoid thoughts

## 2017-03-20 LAB
ALBUMIN SERPL-MCNC: 3 G/DL (ref 3.4–5)
ALP SERPL-CCNC: 73 U/L (ref 40–150)
ALT SERPL W P-5'-P-CCNC: 26 U/L (ref 0–70)
ANION GAP SERPL CALCULATED.3IONS-SCNC: 6 MMOL/L (ref 3–14)
AST SERPL W P-5'-P-CCNC: 14 U/L (ref 0–45)
BASOPHILS # BLD AUTO: 0 10E9/L (ref 0–0.2)
BASOPHILS NFR BLD AUTO: 0.7 %
BILIRUB SERPL-MCNC: 0.3 MG/DL (ref 0.2–1.3)
BUN SERPL-MCNC: 12 MG/DL (ref 7–30)
CALCIUM SERPL-MCNC: 8.7 MG/DL (ref 8.5–10.1)
CHLORIDE SERPL-SCNC: 109 MMOL/L (ref 94–109)
CO2 SERPL-SCNC: 27 MMOL/L (ref 20–32)
CREAT SERPL-MCNC: 0.9 MG/DL (ref 0.66–1.25)
DIFFERENTIAL METHOD BLD: NORMAL
EOSINOPHIL # BLD AUTO: 0.1 10E9/L (ref 0–0.7)
EOSINOPHIL NFR BLD AUTO: 1 %
ERYTHROCYTE [DISTWIDTH] IN BLOOD BY AUTOMATED COUNT: 12.8 % (ref 10–15)
GFR SERPL CREATININE-BSD FRML MDRD: ABNORMAL ML/MIN/1.7M2
GLUCOSE SERPL-MCNC: 89 MG/DL (ref 70–99)
HCT VFR BLD AUTO: 43.1 % (ref 40–53)
HGB BLD-MCNC: 14.5 G/DL (ref 13.3–17.7)
IMM GRANULOCYTES # BLD: 0 10E9/L (ref 0–0.4)
IMM GRANULOCYTES NFR BLD: 0.3 %
LYMPHOCYTES # BLD AUTO: 3.2 10E9/L (ref 0.8–5.3)
LYMPHOCYTES NFR BLD AUTO: 53 %
MCH RBC QN AUTO: 30.4 PG (ref 26.5–33)
MCHC RBC AUTO-ENTMCNC: 33.6 G/DL (ref 31.5–36.5)
MCV RBC AUTO: 90 FL (ref 78–100)
MONOCYTES # BLD AUTO: 0.5 10E9/L (ref 0–1.3)
MONOCYTES NFR BLD AUTO: 8.1 %
NEUTROPHILS # BLD AUTO: 2.2 10E9/L (ref 1.6–8.3)
NEUTROPHILS NFR BLD AUTO: 36.9 %
NRBC # BLD AUTO: 0 10*3/UL
NRBC BLD AUTO-RTO: 0 /100
PLATELET # BLD AUTO: 235 10E9/L (ref 150–450)
POTASSIUM SERPL-SCNC: 4.3 MMOL/L (ref 3.4–5.3)
PROT SERPL-MCNC: 6.3 G/DL (ref 6.8–8.8)
RBC # BLD AUTO: 4.77 10E12/L (ref 4.4–5.9)
SODIUM SERPL-SCNC: 142 MMOL/L (ref 133–144)
VALPROATE SERPL-MCNC: 72 MG/L (ref 50–100)
WBC # BLD AUTO: 6 10E9/L (ref 4–11)

## 2017-03-20 PROCEDURE — 36415 COLL VENOUS BLD VENIPUNCTURE: CPT | Performed by: NURSE PRACTITIONER

## 2017-03-20 PROCEDURE — 80164 ASSAY DIPROPYLACETIC ACD TOT: CPT | Performed by: NURSE PRACTITIONER

## 2017-03-20 PROCEDURE — 12400011

## 2017-03-20 PROCEDURE — 12400000 ZZH R&B MH

## 2017-03-20 PROCEDURE — 25000132 ZZH RX MED GY IP 250 OP 250 PS 637: Performed by: NURSE PRACTITIONER

## 2017-03-20 PROCEDURE — 85025 COMPLETE CBC W/AUTO DIFF WBC: CPT | Performed by: NURSE PRACTITIONER

## 2017-03-20 PROCEDURE — 99232 SBSQ HOSP IP/OBS MODERATE 35: CPT | Performed by: NURSE PRACTITIONER

## 2017-03-20 PROCEDURE — 80053 COMPREHEN METABOLIC PANEL: CPT | Performed by: NURSE PRACTITIONER

## 2017-03-20 RX ADMIN — VALPROIC ACID 750 MG: 250 SOLUTION ORAL at 21:08

## 2017-03-20 RX ADMIN — VALPROIC ACID 750 MG: 250 SOLUTION ORAL at 08:10

## 2017-03-20 RX ADMIN — NICOTINE 1 PATCH: 21 PATCH, EXTENDED RELEASE TRANSDERMAL at 08:10

## 2017-03-20 RX ADMIN — Medication 1 MG: at 22:33

## 2017-03-20 RX ADMIN — NICOTINE POLACRILEX 4 MG: 2 GUM, CHEWING ORAL at 13:44

## 2017-03-20 RX ADMIN — PALIPERIDONE 6 MG: 6 TABLET, EXTENDED RELEASE ORAL at 08:09

## 2017-03-20 ASSESSMENT — ACTIVITIES OF DAILY LIVING (ADL)
GROOMING: INDEPENDENT
DRESS: INDEPENDENT;SCRUBS (BEHAVIORAL HEALTH)
ORAL_HYGIENE: INDEPENDENT

## 2017-03-20 NOTE — PLAN OF CARE
Face to face end of shift report received from JULIO CESAR Vazquez, at [id number]. Rounding completed. Patient observed.     Tristian Grady  3/20/2017  12:32 AM

## 2017-03-20 NOTE — PLAN OF CARE
Problem: Discharge Planning  Goal: Discharge Planning (Adult, OB, Behavioral, Peds)  Met with pt and discussed discharge plan for tomorrow- his parents will pick him up in the afternoon.  Pt asked about the [facility]  and how to follow up with her- writer let him know her name and number would be on the discharge plans and he can call her when he gets home.

## 2017-03-20 NOTE — PLAN OF CARE
Problem: Goal Outcome Summary  Goal: Goal Outcome Summary  2330--in bed with eyes closed and breathing regular. 0625--still in bed with eyes closed and breathing regular.

## 2017-03-20 NOTE — PLAN OF CARE
Face to face end of shift report received from Tristian CASE RN. Rounding completed. [place] [facility] [place]     Jane Ricks  3/20/2017  7:24 AM

## 2017-03-20 NOTE — PROGRESS NOTES
[facility]  Psychiatric Progress Note    Subjective   This is a 41 year old male being who has a significant past psychiatric history of paranoia and psychosis.    Bandar is doing well today. He has not voiced any paranoia or delusions to me or to nursing staff in several days. He feels that the Invega has been very helpful. His behavior has been appropriate and he has been attending groups. I did speak with his mother today and she is in agreement to come pick him up tomorrow at discharge. They report that he seems to be doing much better and feel comfortable taking him home.           DIagnoses:         Unspecified psychotic disorder  Rule out delusional disorder, persecutory type  R/o Bipolar disorder, most recent episode  Manic, severe with psychotic features  R/o Schizoaffective disorder,bipolar type            Attestation:  Patient has been seen and evaluated by me, Keily Luo NP          Interim History:   The patient's care was discussed with the treatment team and chart notes were reviewed.          Medications:     Prescription Medications as of 3/20/2017             Lurasidone HCl (LATUDA PO) Take 40 mg by mouth daily      Facility Administered Medications as of 3/20/2017             paliperidone (INVEGA) 24 hr tablet 6 mg Take 1 tablet (6 mg) by mouth daily    valproic acid (DEPAKENE) syrup 750 mg Take 15 mLs (750 mg) by mouth every 12 hours    OLANZapine (zyPREXA) tablet 5 mg Take 1 tablet (5 mg) by mouth 3 times daily as needed for aggression (paranoia)    melatonin tablet 1 mg Take 1 tablet (1 mg) by mouth nightly as needed for sleep or Insomnia    nicotine polacrilex (NICORETTE) gum 2-4 mg Place 1-2 each (2-4 mg) inside cheek every hour as needed for smoking cessation    diphenhydrAMINE (BENADRYL) capsule 25 mg Take 1 capsule (25 mg) by mouth every 4 hours as needed for itching or other (anxiety,EPS)    benztropine (COGENTIN) tablet 0.5 mg Take 1 tablet (0.5 mg) by mouth 2 times  daily as needed for agitation or other (eps)    hydrOXYzine (ATARAX) tablet 25-50 mg Take 1-2 tablets (25-50 mg) by mouth every 4 hours as needed for anxiety    acetaminophen (TYLENOL) tablet 650 mg Take 2 tablets (650 mg) by mouth every 4 hours as needed for mild pain    nicotine Patch in Place Place onto the skin every 8 hours    nicotine patch REMOVAL Place onto the skin daily    nicotine (NICODERM CQ) 21 MG/24HR 24 hr patch 1 patch Place 1 patch onto the skin daily                Allergies:   No Known Allergies         Psychiatric Examination:   /67  Pulse 85  Temp 96.6  F (35.9  C) (Tympanic)  Resp 14  Wt 95.3 kg (210 lb 1.6 oz)  SpO2 97%  BMI 27.72 kg/m2  Weight is 210 lbs 1.57 oz  Body mass index is 27.72 kg/(m^2).    Appearance:  awake, alert and dressed in hospital scrubs  Attitude:  improved  Eye Contact:  fair  Mood:  improved  Affect:  appropriate  Speech:  clear, coherent  Psychomotor Behavior:  no evidence of tardive dyskinesia, dystonia, or tics and intact station, gait and muscle tone  Thought Process:  goal oriented  Associations:  no loose associations  Thought Content:  Denies any suicidal or homicidal ideation  Insight:  limited  Judgment:  limited  Oriented to:  time, person, and place  Attention Span and Concentration:  fair  Recent and Remote Memory:  fair  Fund of Knowledge: appropriate  Muscle Strength and Tone: normal  Gait and Station: Normal  Perception : clearer         Labs:     Recent Results (from the past 24 hour(s))   Valproic acid    Collection Time: 03/20/17  6:47 AM   Result Value Ref Range    Valproic Acid Level 72 50 - 100 mg/L   Comprehensive metabolic panel    Collection Time: 03/20/17  6:47 AM   Result Value Ref Range    Sodium 142 133 - 144 mmol/L    Potassium 4.3 3.4 - 5.3 mmol/L    Chloride 109 94 - 109 mmol/L    Carbon Dioxide 27 20 - 32 mmol/L    Anion Gap 6 3 - 14 mmol/L    Glucose 89 70 - 99 mg/dL    Urea Nitrogen 12 7 - 30 mg/dL    Creatinine 0.90 0.66 - 1.25  mg/dL    GFR Estimate >90  Non  GFR Calc   >60 mL/min/1.7m2    GFR Estimate If Black >90   GFR Calc   >60 mL/min/1.7m2    Calcium 8.7 8.5 - 10.1 mg/dL    Bilirubin Total 0.3 0.2 - 1.3 mg/dL    Albumin 3.0 (L) 3.4 - 5.0 g/dL    Protein Total 6.3 (L) 6.8 - 8.8 g/dL    Alkaline Phosphatase 73 40 - 150 U/L    ALT 26 0 - 70 U/L    AST 14 0 - 45 U/L   CBC with platelets differential    Collection Time: 03/20/17  6:47 AM   Result Value Ref Range    WBC 6.0 4.0 - 11.0 10e9/L    RBC Count 4.77 4.4 - 5.9 10e12/L    Hemoglobin 14.5 13.3 - 17.7 g/dL    Hematocrit 43.1 40.0 - 53.0 %    MCV 90 78 - 100 fl    MCH 30.4 26.5 - 33.0 pg    MCHC 33.6 31.5 - 36.5 g/dL    RDW 12.8 10.0 - 15.0 %    Platelet Count 235 150 - 450 10e9/L    Diff Method Automated Method     % Neutrophils 36.9 %    % Lymphocytes 53.0 %    % Monocytes 8.1 %    % Eosinophils 1.0 %    % Basophils 0.7 %    % Immature Granulocytes 0.3 %    Nucleated RBCs 0 0 /100    Absolute Neutrophil 2.2 1.6 - 8.3 10e9/L    Absolute Lymphocytes 3.2 0.8 - 5.3 10e9/L    Absolute Monocytes 0.5 0.0 - 1.3 10e9/L    Absolute Eosinophils 0.1 0.0 - 0.7 10e9/L    Absolute Basophils 0.0 0.0 - 0.2 10e9/L    Abs Immature Granulocytes 0.0 0 - 0.4 10e9/L    Absolute Nucleated RBC 0.0              Assessment/ Plan:   Continue current medications   Plan to discharge home with parents tomorrow

## 2017-03-20 NOTE — PLAN OF CARE
"Problem: Goal Outcome Summary  Goal: Goal Outcome Summary  Outcome: Improving  Pt denies criteria this shift. Pt shows insight into his illness stating\" i probably shouldn't have done what i did, taking my money and leaving. i have a good job and a supportive family.\" pt states his mom and dad, sister and friend are very supportive of him. Pt reports his job is even working with him while he is here and hes ready to go back to work. Pt laughing and joking with writer. Pt showered this shift. Pt happy to be going home tomorrow.     Problem: Suicide Risk (Adult)  Goal: Identify Related Risk Factors and Signs and Symptoms  Patient will identify risk factors and triggers to suicidal ideations by discharge.   Outcome: Improving  Pt able to identify triggers for anxiety.  Goal: Strength-Based Wellness/Recovery  Patient will attend 50% of groups while hospitalized.   Pt will verbalize a reduction or resolution in suicidal thoughts by discharge.   Outcome: Improving  Pt attending groups and participating.  Pt denies SI HI SIB  Goal: Physical Safety  Patient will be free from harming self and others while on the unit until discharge.   Outcome: Improving  Pt free of self harm and harm to others    Problem: Thought Process Alteration (Adult)  Goal: Improved Thought Process  Paranoid thoughts will resolve by discharge.   Outcome: Improving  Pt has had no paranoid thoughts this shift      "

## 2017-03-20 NOTE — PLAN OF CARE
Face to face end of shift report received from JULIO CESAR Sykes. Rounding completed. [place] in [facility] [place].     George Germain  3/20/2017  3:58 PM

## 2017-03-20 NOTE — PLAN OF CARE
Behavioral Health Team Discussion and Plan of Care Review:     Continued Stay Criteria/Rationale: voluntary status.  Awaiting transportation to home. Continue with current medications.    Plan: discharge 3/21/17 in afternoon with parents as transport    Participants: NP, nursing, OT, social work, PCS    Summary/Recommendation: discharge tomorrow.  Continue current medications    Medical/Physical: See H&P    Progress: gradual

## 2017-03-21 VITALS
RESPIRATION RATE: 18 BRPM | DIASTOLIC BLOOD PRESSURE: 71 MMHG | BODY MASS INDEX: 27.72 KG/M2 | SYSTOLIC BLOOD PRESSURE: 128 MMHG | WEIGHT: 210.1 LBS | TEMPERATURE: 97.9 F | HEART RATE: 97 BPM | OXYGEN SATURATION: 98 %

## 2017-03-21 PROCEDURE — 99239 HOSP IP/OBS DSCHRG MGMT >30: CPT | Performed by: NURSE PRACTITIONER

## 2017-03-21 PROCEDURE — 25000132 ZZH RX MED GY IP 250 OP 250 PS 637: Performed by: NURSE PRACTITIONER

## 2017-03-21 RX ORDER — PALIPERIDONE 6 MG/1
6 TABLET, EXTENDED RELEASE ORAL DAILY
Qty: 30 TABLET | Refills: 0 | Status: ON HOLD | OUTPATIENT
Start: 2017-03-21 | End: 2022-09-17

## 2017-03-21 RX ADMIN — PALIPERIDONE 6 MG: 6 TABLET, EXTENDED RELEASE ORAL at 08:08

## 2017-03-21 RX ADMIN — VALPROIC ACID 750 MG: 250 SOLUTION ORAL at 08:08

## 2017-03-21 ASSESSMENT — ACTIVITIES OF DAILY LIVING (ADL)
DRESS: INDEPENDENT;SCRUBS (BEHAVIORAL HEALTH)
GROOMING: INDEPENDENT
ORAL_HYGIENE: INDEPENDENT

## 2017-03-21 NOTE — PLAN OF CARE
Face to face end of shift report received from George RN, at 2314. Rounding completed. Patient observed.     Tristian Grady  3/21/2017  12:42 AM

## 2017-03-21 NOTE — PLAN OF CARE
Discharge Note    Patient Discharged to home on 3/21/2017 12:30 PM via Private Car accompanied by  parents.     Patient informed of discharge instructions in AVS. patient verbalizes understanding and denies having any questions pertaining to AVS. Patient stable at time of discharge. Patient denies SI, HI, and thoughts of self harm at time of discharge. All personal belongings returned to patient. Discharge prescriptions sent to [facility] in [place] via electronic communication. Psych evaluation, history and physical, AVS, and discharge summary faxed to next level of care- per discharge planner.     Jaen Ricks  3/21/2017  12:34 PM      1328: pt requested a letter from provider stating it was ok to return to work and that meds will not interfere with his job.  Provider wrote a letter but we are unable to fax it to his employee at this time d/t not having a release of information. If pt calls, the letter is in an envelope taped to wall in nurse's stations with pt name on it. Pt will need to fax us a release giving us permission to fax it to employer.

## 2017-03-21 NOTE — PLAN OF CARE
"Problem: Goal Outcome Summary  Goal: Goal Outcome Summary  He is up on the unit and is social with others. He denies any thoughts of suicide. He has some depression but states that he feels \"pretty good\". He is anticipating discharge tomorrow and feels ready for that.     Problem: Suicide Risk (Adult)  Goal: Strength-Based Wellness/Recovery  Patient will attend 50% of groups while hospitalized.   Pt will verbalize a reduction or resolution in suicidal thoughts by discharge.   He is not having any suicidal thoughts.   Goal: Physical Safety  Patient will be free from harming self and others while on the unit until discharge.   He is not harming himself or having thoughts of self harm.    Problem: Thought Process Alteration (Adult)  Goal: Improved Thought Process  Paranoid thoughts will resolve by discharge.   He is not feeling paranoid or making any paranoid statements.      "

## 2017-03-21 NOTE — DISCHARGE SUMMARY
"Psychiatric Discharge Summary    Bandar Lawson MRN# [id number]   Age: 41 year old YOB: 1975     Date of Admission:  3/10/2017  Date of Discharge:  3/21/2017  Admitting Physician:  Samuel Pereira MD  Discharge Physician:  Keily Luo NP          Event Leading to Hospitalization and Hospital Stay   This patient is a 41 year old male with a significant past psychiatric history of paranoia and psychosis who presents with worsening symptoms and thoughts of dying. Prior to admission Bandar reports that he and his father went out to the [place] where his father was speaking with a family friend. He believed they were talking about him, plotting against him, and ultimately had something to do with \"getting rid of him\". He became concerned, got in his truck went to the [employer] withdrew 5,000.00 and planned to go to [place]. He drove down to the [place] stating people were flipping him off, he denies driving erratically. When he got to the [facility] he called his sister who brought him to \"some emergency room\". While in the ED,\"everyone stared at him, memorizing things about him and planning to get rid of him\".  Upon admission he requests I allow him to go get a gun so that he may return and shot himself. \"I'm not long for the world\".  He had recently changed jobs, purchasing COBRA insurance and found his COBRA did not cover his medication (Saphris ODT) which was around 986.00 a month. This was also a factor in him not taking the medication. He did \"OK\" for a few weeks and slowly things began to unravel, his paranoia worsened, he became more delusional and his parents went to the [facility] and moved him home to [place] where they could be closer to him. Bandar reports a cyclical nature to his mood,\"some up and some down\", \"I do better in the summer\". He also endorses racing thoughts, \"when I talk to people and they are speaking I can look at them and not even hear what they say because my mind is going so fast\". Pt " reports sleep is routine at 3-4 hours a night.    By the time of discharge Bandar is reporting no paranoia or hallucinations and is sleeping well. Latuda had initially been increased right after admission then discontinued due to poor response. He was started on Abilify and Depakote. Abilify did not achieve therapeutic effects so this was discontinued and Invega was started.  He was stabilized on Invega and Depakote at the time of discharge. A petition for commitment was filed during this admission, though patient was agreeable to stay voluntarily in lieu of commitment. His thought process quickly cleared on Invega and he was determined to be almost back to baseline. He will be staying with his family at discharge and will be set up with medication management appointment and will follow-up with . He does verbalize a plan for safety.         At time of discharge, there is no evidence that patient is in immediate danger of self or others.        DIagnoses:   Unspecified psychotic disorder  R/o Bipolar disorder, most recent episode Manic, severe with psychotic features  R/o Schizoaffective disorder,bipolar type              Labs:     Results for orders placed or performed during the hospital encounter of 03/10/17 (from the past 48 hour(s))   Valproic acid   Result Value Ref Range    Valproic Acid Level 72 50 - 100 mg/L   Comprehensive metabolic panel   Result Value Ref Range    Sodium 142 133 - 144 mmol/L    Potassium 4.3 3.4 - 5.3 mmol/L    Chloride 109 94 - 109 mmol/L    Carbon Dioxide 27 20 - 32 mmol/L    Anion Gap 6 3 - 14 mmol/L    Glucose 89 70 - 99 mg/dL    Urea Nitrogen 12 7 - 30 mg/dL    Creatinine 0.90 0.66 - 1.25 mg/dL    GFR Estimate >90  Non  GFR Calc   >60 mL/min/1.7m2    GFR Estimate If Black >90   GFR Calc   >60 mL/min/1.7m2    Calcium 8.7 8.5 - 10.1 mg/dL    Bilirubin Total 0.3 0.2 - 1.3 mg/dL    Albumin 3.0 (L) 3.4 - 5.0 g/dL    Protein Total 6.3 (L) 6.8 -  8.8 g/dL    Alkaline Phosphatase 73 40 - 150 U/L    ALT 26 0 - 70 U/L    AST 14 0 - 45 U/L   CBC with platelets differential   Result Value Ref Range    WBC 6.0 4.0 - 11.0 10e9/L    RBC Count 4.77 4.4 - 5.9 10e12/L    Hemoglobin 14.5 13.3 - 17.7 g/dL    Hematocrit 43.1 40.0 - 53.0 %    MCV 90 78 - 100 fl    MCH 30.4 26.5 - 33.0 pg    MCHC 33.6 31.5 - 36.5 g/dL    RDW 12.8 10.0 - 15.0 %    Platelet Count 235 150 - 450 10e9/L    Diff Method Automated Method     % Neutrophils 36.9 %    % Lymphocytes 53.0 %    % Monocytes 8.1 %    % Eosinophils 1.0 %    % Basophils 0.7 %    % Immature Granulocytes 0.3 %    Nucleated RBCs 0 0 /100    Absolute Neutrophil 2.2 1.6 - 8.3 10e9/L    Absolute Lymphocytes 3.2 0.8 - 5.3 10e9/L    Absolute Monocytes 0.5 0.0 - 1.3 10e9/L    Absolute Eosinophils 0.1 0.0 - 0.7 10e9/L    Absolute Basophils 0.0 0.0 - 0.2 10e9/L    Abs Immature Granulocytes 0.0 0 - 0.4 10e9/L    Absolute Nucleated RBC 0.0                   Discharge Medications:     Prescription Medications as of 3/21/2017             valproic acid (DEPAKENE) 250 MG/5ML SYRP syrup Take 15 mLs (750 mg) by mouth every 12 hours    paliperidone (INVEGA) 6 MG 24 hr tablet Take 1 tablet (6 mg) by mouth daily      Facility Administered Medications as of 3/21/2017             paliperidone (INVEGA) 24 hr tablet 6 mg Take 1 tablet (6 mg) by mouth daily    valproic acid (DEPAKENE) syrup 750 mg Take 15 mLs (750 mg) by mouth every 12 hours    OLANZapine (zyPREXA) tablet 5 mg Take 1 tablet (5 mg) by mouth 3 times daily as needed for aggression (paranoia)    melatonin tablet 1 mg Take 1 tablet (1 mg) by mouth nightly as needed for sleep or Insomnia    nicotine polacrilex (NICORETTE) gum 2-4 mg Place 1-2 each (2-4 mg) inside cheek every hour as needed for smoking cessation    diphenhydrAMINE (BENADRYL) capsule 25 mg Take 1 capsule (25 mg) by mouth every 4 hours as needed for itching or other (anxiety,EPS)    benztropine (COGENTIN) tablet 0.5 mg Take 1  tablet (0.5 mg) by mouth 2 times daily as needed for agitation or other (eps)    hydrOXYzine (ATARAX) tablet 25-50 mg Take 1-2 tablets (25-50 mg) by mouth every 4 hours as needed for anxiety    acetaminophen (TYLENOL) tablet 650 mg Take 2 tablets (650 mg) by mouth every 4 hours as needed for mild pain    nicotine Patch in Place Place onto the skin every 8 hours    nicotine patch REMOVAL Place onto the skin daily    nicotine (NICODERM CQ) 21 MG/24HR 24 hr patch 1 patch Place 1 patch onto the skin daily               Justification for dual anti-psychotic use: not applicable       Psychiatric Examination:   Appearance:  awake, alert and adequately groomed  Attitude:  cooperative  Eye Contact:  good  Mood:  better  Affect:  appropriate and in normal range  Speech:  clear, coherent  Psychomotor Behavior:  no evidence of tardive dyskinesia, dystonia, or tics  Thought Process:  logical and goal oriented  Associations:  no loose associations  Thought Content:  no evidence of suicidal ideation or homicidal ideation, no evidence of psychotic thought and no auditory hallucinations present  Insight:  good  Judgment:  fair  Oriented to:  time, person, and place  Attention Span and Concentration:  intact  Recent and Remote Memory:  intact  Fund of Knowledge: appropriate  Muscle Strength and Tone: normal  Gait and Station: Normal  Perception:        Discharge Plan:   Psychiatry Follow-up:     [facility]   Medication Management - Joyce Drakeon - March 29th, @ 8:30 am.  [address] [place] [address]  Phone: [phone]  Fax: [phone]      [facility] Care Coordinator: Kiara [phone]     [facility] : Kaylee Gomez- call [phone] Call to follow up with.  [facility]   [address] [place] [address]  3rd floor, [address]  [place] [address]  Phone: [phone]  Fax: [phone]    Attestation:  The patient has been seen and evaluated by me,  Keily Luo, NP          Discharge Services Provided:    60 minutes spent on discharge services, including:  Final examination of patient.  Review and discussion of Hospital stay.  Instructions for continued outpatient care/goals.  Preparation of discharge records.  Preparation of medications refills and new prescriptions.

## 2017-03-21 NOTE — PLAN OF CARE
Problem: Goal Outcome Summary  Goal: Goal Outcome Summary  Outcome: Improving  Pt denies criteria. Pt states he didn't sleep well but attributes it to the excitement of leaving today. Pt is pleasant and cooperative. Pt laughing, smiling and joking. No paranoid statements. Pt shows insight into his illness and asking appropriate questions for what to expect after discharge. Pt cant wait to get back to work.     Problem: Suicide Risk (Adult)  Goal: Identify Related Risk Factors and Signs and Symptoms  Patient will identify risk factors and triggers to suicidal ideations by discharge.   Outcome: Improving  Pt able to identify triggers for SI  Goal: Strength-Based Wellness/Recovery  Patient will attend 50% of groups while hospitalized.   Pt will verbalize a reduction or resolution in suicidal thoughts by discharge.   Outcome: Improving  Pt attending groups and participating.  Pt denies SI HI SIB  Goal: Physical Safety  Patient will be free from harming self and others while on the unit until discharge.   Outcome: Improving  Pt has been free of self harm and harm to others    Problem: Thought Process Alteration (Adult)  Goal: Improved Thought Process  Paranoid thoughts will resolve by discharge.   Outcome: Improving  Pt has not made any paranoid statements

## 2017-03-21 NOTE — PLAN OF CARE
Problem: Goal Outcome Summary  Goal: Goal Outcome Summary  2340--in bed with eyes closed and breathing regular. 0643--still in bed with eyes closed and breathing regular. 0700--is pleasant in conversation.

## 2017-03-21 NOTE — DISCHARGE INSTRUCTIONS
Behavioral Discharge Planning and Instructions    Summary: Bandar was admitted with suicidal ideation, paranoia and psychosis.    Main Diagnosis: delusional disorder vs schizoaffective disorder vs mood disorder w/ psychotic features.    Major Treatments, Procedures and Findings: Stabilize with medications, connect with community programs.     Symptoms to Report: feeling more aggressive, increased confusion, losing more sleep, mood getting worse or thoughts of suicide    Lifestyle Adjustment: Take all medications as prescribed, meet with doctor/medication provider as scheduled. Abstain from alcohol or any unprescribed drugs.     Psychiatry Follow-up:    [facility]   [employer] Management - Joyce Nick - March 29th, @ 8:30 am.  [address] [place] [place] [address]  Phone: [phone]  Fax: [phone]     [facility] Care Coordinator: Kiara [phone]    [facility] : Kaylee Gomez- call [phone] Call to follow up with.  [facility]   [address]  3rd floor, [address]  [place] [address]  Phone: [phone]  Fax: [phone]    Resources:   Crisis Intervention: 307.906.7875 or 848-389-8502 (TTY: 760.845.2947).  Call anytime for help.  National [place] on Mental Illness (www.[place].andressa.org): 162.434.4947 or 641-333-1268.  Alcoholics Anonymous (www.alcoholics-anonymous.org): Check your phone book for your local chapter.  Suicide Awareness Voices of Education (SAVE) (www.save.org): 105-953-CVBM (9032)  National Suicide Prevention Line (www.mentalhealthmn.org): 122-696-MSDO (5336)  Mental Health Consumer/Survivor Network of [place] (www.mhcsn.net): 391.191.9654 or 755-320-9435  Mental Health Association of [place] (www.mentalhealth.org): 519.352.4831 or 530-214-0705    General Medication Instructions:   See your medication sheet(s) for instructions.   Take all medicines as directed.  Make no changes unless your doctor suggests them.   Go to all your doctor  visits.  Be sure to have all your required lab tests. This way, your medicines can be refilled on time.  Do not use any drugs not prescribed by your doctor.  Avoid alcohol.    [place] Area:  [facility], Crisis stabilization \A Chronology of [place] Hospitals\""- [phone]  Critical access hospital Crisis Line: 1-626.933.4072  Advocates For Family Peace: 124-5133  Sexual Assault Program of [facility]: 150.792.9883 or 1-265.567.9173  [place] Battered Women's Program: 1-137.917.6745 Ext: 279       Calls answered Mon-Fri-8:00 am--4:30 pm    [place]:  Advocates for Family Peace: 1-514.117.1759  [facility] first call for help: 1-144.658.5293  [facility]:  (229) 215-8790      [place] Area:  Warm Line: 1-145.730.1099       Calls answered Tuesday--Saturday 4:00 pm--10:00 pm  [facility] Crisis Line - 155.949.2285  [place] [facility] 677-202-8909    [place] Statewide:  [place] Crisis and Referral Services: 1-120.884.7760  National Suicide Prevention Lifeline: 6-089-645-TALK (5850)   - lro1pdop- Text  Life  to [address]  First Call for Help: 2-1-1  [place] Helpline- 3-373-OXCZ-HELP

## 2017-03-21 NOTE — PLAN OF CARE
Face to face end of shift report received from Tristian CASE RN. Rounding completed. Patient observed in [facility].     Jane Ricks  3/21/2017  7:21 AM

## 2017-03-21 NOTE — PLAN OF CARE
Problem: Discharge Planning  Goal: Discharge Planning (Adult, OB, Behavioral, Peds)  Writer met with pt and discussed discharge plans. Pt is discharging at the recommendation of the treatment team. Pt is discharging to home transported by his parents. Pt denies having any thoughts of hurting themself or anyone else. Pt denies anxiety or depression. Pt has follow up with Joyce Nick. Discharge instructions, including; demographic sheet, psychiatric evaluation, discharge summary, and AVS were faxed to these next level of care providers.    Writer let Linea- pt's health partner's care coordinator know that pt is discharging home and will have a follow up with Joyce Nick and provided his 's contact information.- Kaylee Gomez who will be getting pt into an [facility] program.

## 2017-03-30 ENCOUNTER — TELEPHONE (OUTPATIENT)
Dept: BEHAVIORAL HEALTH | Facility: HOSPITAL | Age: 42
End: 2017-03-30

## 2017-03-30 NOTE — TELEPHONE ENCOUNTER
Patient was discharged to home on 3/21/2017 from 5  at [facility]. Left message regarding discharge follow up.

## 2020-04-24 NOTE — H&P
"History and Physical    Bandar Lawson MRN# [id number]   Age: 41 year old YOB: 1975     Date of Admission:  3/10/2017            Chief Complaint:   Chief Complaint:\" I'm not long for this world, people out there want to kill me\"    History is obtained from the patient and providers previous visit on 3.8.17         History of Present Illness:        This patient is a 41 year old  male with a significant past psychiatric history of  paranoia and psychosis who presents with worsening symptoms and thoughts of dying. I had actually seen Bandar in clinic at [facility] in [place] on 3.8.17 for the first time. Family had brought him in from [facility] as when they called no one would see him for 8 weeks. He was worked in on an emergent basis with plans to f/u weekly. I discussed with his parents at the time of his visit his symptoms may not respond to out patient medications and he may require a more intensive medication regime to stabilize given the length of time he had experienced the paranoia.          Bandar reports following his appointment on the 8th he and his father went out to the [employer] where his father was speaking with a family friend. He believes they were talking about him, plotting against him, and ultimately have something to do with \"getting rid of him\". He became concerned, got in his truck went to the [employer] withdrew 5,000.00 and planned to go to [place]. He drove down to the cities stating people were flipping him off, he denies driving erratically. When he got to the [facility] he called his sister who brought him to \"some emergency room\". While in the ED,\"everyone stared at him, memorizing things about him and planning to get rid of him\".       Bandar this am requests I allow him to go get a gun so that he may return and shot himself. \"I'm not long for the world\". I reminded him of how well he had done months ago when he was taking his medication. It was during this time he " "reported feeling \"so good I could go off and he did\". He then changed jobs, purchasing COBRA insurance and found his cobra did not cover his medication (Saphris ODT) which was around 986.00 a month.  This was also a factor in him not taking the medication. He did \"OK\" for a few weeks and slowly things began to unravel, his paranoia worsened, he became more delusional and his parents went to the [facility] and moved him home to [place] where they could be closer to him.        Bandar reports a cyclical nature to his mood,\"some up and some down\", \"I do better in the summer\". He also endorses racing thoughts, \"when I talk to people and they are speaking I can look at them and not even hear what they say because my mind is going so fast\". Pt reports sleep is routine at 3-4 hours a night.     Suicidal Ideation: Current- requests to be allowed to purchase a gun.   Suicidal Attempts: none  Family Suicide HX: none  Self Injurious Behavior: none  Homicidal Ideation: none    Past Psychiatric HX:   Hospitalizations: This is pts first hospitalization   CD Treatments: twice in his 20's, family wonders if his mental health issues may have been masked at that time.    Current Stressors:   Pt is very stressed over losing his job. His supervisor stated this is not a concern, they want him to get well and return to work as he is excellent at programing machines.   Personal: Very stressed over \"losing the ability to connect with a high profile woman, she is famous I think\".   Housing: Pt has an apt in [place] but is staying with his parents at the present.                Psychiatric Review of Systems:   Psychiatric review of systems reveals: paranoia and psychosis, suicidal ideation, depressed mood, poor concentration, sleep difficulty, ruminative thoughts and hopelessness, negative, negative, racing throughts and distractibility, negative, delusions and paranoia, avoidance.        Current Mood: \"I don't know, pretty scared because I " Interim chart reviewed. Neuro note noted.  Pt in Status Epi.    RFT's appear to be at a plateau.  .    PAST MEDICAL & SURGICAL HISTORY:  H/O Crohn's disease  History of resection of terminal ileum        REVIEW OF SYSTEMS:  Unable to obtain.    MEDICATIONS  (STANDING):  chlorhexidine 0.12% Liquid 15 milliLiter(s) Oral Mucosa every 12 hours  chlorhexidine 2% Cloths 1 Application(s) Topical <User Schedule>  cholecalciferol 1000 Unit(s) Oral every 24 hours  dextrose 5%. 1000 milliLiter(s) (50 mL/Hr) IV Continuous <Continuous>  dextrose 50% Injectable 12.5 Gram(s) IV Push once  dextrose 50% Injectable 25 Gram(s) IV Push once  dextrose 50% Injectable 25 Gram(s) IV Push once  enoxaparin Injectable 70 milliGRAM(s) SubCutaneous every 24 hours  insulin regular  human corrective regimen sliding scale   SubCutaneous every 6 hours  lacosamide 150 milliGRAM(s) Oral once  lacosamide Solution 150 milliGRAM(s) Oral two times a day  levETIRAcetam  IVPB 1500 milliGRAM(s) IV Intermittent every 12 hours  LORazepam   Injectable 2 milliGRAM(s) IV Push once  metoprolol tartrate 12.5 milliGRAM(s) Oral every 12 hours  midodrine 15 milliGRAM(s) Oral every 8 hours  vasopressin Infusion 0.04 Unit(s)/Min (2.4 mL/Hr) IV Continuous <Continuous>    MEDICATIONS  (PRN):  acetaminophen    Suspension .. 650 milliGRAM(s) Oral every 6 hours PRN Temp greater or equal to 38C (100.4F)  dextrose 40% Gel 15 Gram(s) Oral once PRN Blood Glucose LESS THAN 70 milliGRAM(s)/deciliter  glucagon  Injectable 1 milliGRAM(s) IntraMuscular once PRN Glucose LESS THAN 70 milligrams/deciliter      Allergies    Zosyn (Rash)    Intolerances        SOCIAL HISTORY:   denies tobacco, etoh and drugs  ambulatory   (30 Mar 2020 00:19)      FAMILY HISTORY:  FH: type 2 diabetes      Vital Signs Last 24 Hrs  T(C): 36.9 (2020 08:00), Max: 37.1 (2020 00:00)  T(F): 98.4 (2020 08:00), Max: 98.7 (2020 00:00)  HR: 80 (2020 11:00) (78 - 110)  BP: 111/55 (2020 11:00) (100/56 - 139/63)  BP(mean): 78 (2020 11:00) (73 - 94)  RR: 17 (2020 11:00) (13 - 19)  SpO2: 98% (2020 11:00) (97% - 99%)     @ 07: @ 07:00  --------------------------------------------------------  IN: 882.2 mL / OUT: 1140 mL / NET: -257.8 mL     @ 07:  -   @ 13:40  --------------------------------------------------------  IN: 122.6 mL / OUT: 5 mL / NET: 117.6 mL      PHYSICAL EXAM:  Deferred per Covid protocol.  See primary team note.        LABS:                        9.6    11.88 )-----------( 263      ( 2020 07:29 )             32.7     04-24    143  |  109<H>  |  43<H>  ----------------------------<  108<H>  3.9   |  23  |  1.63<H>    Ca    9.7      2020 07:29  Phos  3.6     04-24  Mg     2.4     04-24    TPro  5.8<L>  /  Alb  2.6<L>  /  TBili  0.2  /  DBili  x   /  AST  11  /  ALT  15  /  AlkPhos  86  04-24      Urinalysis Basic - ( 2020 16:35 )    Color: Yellow / Appearance: Cloudy / S.020 / pH: x  Gluc: x / Ketone: NEGATIVE  / Bili: Negative / Urobili: 0.2 E.U./dL   Blood: x / Protein: 30 mg/dL / Nitrite: NEGATIVE   Leuk Esterase: Trace / RBC: Many /HPF / WBC 5-10 /HPF   Sq Epi: x / Non Sq Epi: 0-5 /HPF / Bacteria: Present /HPF        RADIOLOGY & ADDITIONAL STUDIES: Renal US : Mild MRD "know I'm going to die as soon as the people get to me\".    MDD Criteria:Pt denies feelings of depression, denies loss of interest, no change in weight, denies fatigue, psychomotor agitation, or decreased concentration. Pt experiences recurrent thoughts of death and is paranoid about \"others who are out to get him\".     [place] - Pt endorses racing thoughts, requires very little sleep.    PTSD - Denies nightmares, flashbacks, intrusive thoughts, emotional numbness    Abuse Hx: Denies personal, witnessed, victim or perpetrator:   Physical: denies   Emotional: denies   Sexual: denies    Sexuality: denies sexual concerns, is heterosexual.    Other Addictive Behaviors: denies gambling, [email or web address]    Psychotic Symptoms: endorses delusions, paranoia         Medical Review of Systems:   The 10 point Review of Systems is negative other than noted in the HPI           Psychiatric History:     Prior Psychiatric Diagnoses: yes, delusional disorder   Psychiatric Hospitalizations: None   History of Psychosis yes   Suicide Attempts No   Self-Injurious Behavior: none   Violence Toward Others none   History of ECT: none   Use of Psychotropics Saphris, recently started on Latuda              Substance Use History:    Alcohol when younger. In treatment X2   Reports having 1 drink 2 weeks ago. Prior to this it had been weeks.   No drug use.         Past Medical History:   Reviewed and non-contributory  No past medical history on file.     Primary Care Clinic: In [facility], has not established   Primary Care Physician: Edgar Powers  No History of: hepatitis, HIV, head trauma with or without loss of consciousness and seizures         Past Surgical History:    Hazel teeth only             Allergies:    No Known Allergies           Medications:    Latuda 40 mg daily ( has only taken X10          Social History:   Early history: NO PROBLEMS   Educational history:  High school graduate, went on to tech school in [email or web address]   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 None      Strengths: \"I'm a really good worker\"  Weaknesses: \"I don't know but someone must, that is why they are after me\".  Psychosocial Support System: great family support.  What the patient does for fun: \"nothing really\"           Family History:   Mental Health: There is some history of mental illness, no one talks about it unsure of what it is.  Medical: HTN         Labs:    Vitamin D3, TSH    /76  Pulse 80  Temp 97.5  F (36.4  C) (Tympanic)  Resp 16  SpO2 98%  Weight is 0 lbs 0 oz  There is no height or weight on file to calculate BMI.         Psychiatric Examination:   Appearance:  awake, alert and dressed in hospital scrubs  Attitude:  guarded  Eye Contact:  good  Mood:  anxious  Affect:  mood congruent  Speech:  clear, coherent  Psychomotor Behavior:  no evidence of tardive dyskinesia, dystonia, or tics and intact station, gait and muscle tone  Thought Process:  illogical  Associations:  loosening of associations present  Thought Content:  active suicidal ideation present  Insight:  limited  Judgment:  limited  Oriented to:  time, person, and place  Attention Span and Concentration:  fair  Recent and Remote Memory:  fair  Language: Able to name objects, Able to repeat phrases and Able to read and write  Fund of Knowledge: appropriate  Muscle Strength and Tone: normal  Gait and Station: Normal    Legal- denies    Spirituality- believes in God.         Physical Exam:   Constitutional:   awake, alert, cooperative, no apparent distress, and appears stated age     ENT:   Normocephalic, without obvious abnormality, atraumatic, sinuses nontender on palpation, " external ears without lesions, oral pharynx with moist mucous membranes, tonsils without erythema or exudates, gums normal and good dentition.     Neck:   Supple, symmetrical, trachea midline, no adenopathy, thyroid symmetric, not enlarged and no tenderness, skin normal     Lungs:   No increased work of breathing, good air exchange, clear to auscultation bilaterally, no crackles or wheezing     Cardiovascular:   Normal apical impulse, regular rate and rhythm, normal S1 and S2, no S3 or S4, and no murmur noted     Abdomen:   No scars, normal bowel sounds, soft, non-distended, non-tender, no masses palpated, no hepatosplenomegally     Musculoskeletal:   There is no redness, warmth, or swelling of the joints.  Full range of motion noted.  Motor strength is 5 out of 5 all extremities bilaterally.  Tone is normal.       Appetite: good  Pain:Denies           Diagnoses:      delusional disorder vs schizoaffective disorder vs mood disorder w/ psychotic features           Plan:   Admit to Unit: [address] [place] [address]. Pt is medically stable.  Attending Physician: Joyce Nick CNP  Patient is: 72 hour mental health hold  Routine lab studies have been requested  Monitor for target symptoms.   Provide a safe environment and therapeutic milieu.   1. Medications: zyprexa PRN, Latuda 40 mg today, increase to 80 mg 3.12.17  2. Labs/other tests: Vitamin D3  3. Encouraged group milieu participation/attendance  4.  data: Lives with elderly parents.  5. Referrals for CD tx, eval , medical referral if needed  6. Education on Dx, medications, treatments (CD or other)    Attestation:  Patient has been seen and evaluated by me, MAYNOR Strong CNP, in the presence of the house staff team  The patient was counseled on  nature of illness and treatment plan/options

## 2022-09-16 ENCOUNTER — TRANSFERRED RECORDS (OUTPATIENT)
Dept: HEALTH INFORMATION MANAGEMENT | Facility: CLINIC | Age: 47
End: 2022-09-16

## 2022-09-16 ENCOUNTER — TELEPHONE (OUTPATIENT)
Dept: BEHAVIORAL HEALTH | Facility: CLINIC | Age: 47
End: 2022-09-16

## 2022-09-16 ENCOUNTER — HOSPITAL ENCOUNTER (INPATIENT)
Facility: HOSPITAL | Age: 47
LOS: 5 days | Discharge: HOME | DRG: 885 | End: 2022-09-21
Attending: STUDENT IN AN ORGANIZED HEALTH CARE EDUCATION/TRAINING PROGRAM | Admitting: STUDENT IN AN ORGANIZED HEALTH CARE EDUCATION/TRAINING PROGRAM
Payer: COMMERCIAL

## 2022-09-16 DIAGNOSIS — F25.0 SCHIZOAFFECTIVE DISORDER, BIPOLAR TYPE (H): Primary | ICD-10-CM

## 2022-09-16 DIAGNOSIS — E78.5 HYPERLIPIDEMIA LDL GOAL <100: ICD-10-CM

## 2022-09-16 DIAGNOSIS — E11.9 TYPE 2 DIABETES MELLITUS WITHOUT COMPLICATION, WITH LONG-TERM CURRENT USE OF INSULIN (H): ICD-10-CM

## 2022-09-16 DIAGNOSIS — Z79.4 TYPE 2 DIABETES MELLITUS WITHOUT COMPLICATION, WITH LONG-TERM CURRENT USE OF INSULIN (H): ICD-10-CM

## 2022-09-16 PROBLEM — F22 PARANOID DELUSION (H): Status: ACTIVE | Noted: 2022-09-16

## 2022-09-16 LAB
ALT SERPL-CCNC: 59 IU/L (ref 6–40)
AST SERPL-CCNC: 43 IU/L (ref 10–40)
CREATININE (EXTERNAL): 1.03 MG/DL (ref 0.7–1.2)
GFR ESTIMATED (EXTERNAL): 91 ML/MIN/1.73M2
GLUCOSE (EXTERNAL): 107 MG/DL (ref 70–99)
POTASSIUM (EXTERNAL): 4.1 MEQ/L (ref 3.4–5.1)

## 2022-09-16 PROCEDURE — 250N000013 HC RX MED GY IP 250 OP 250 PS 637: Performed by: NURSE PRACTITIONER

## 2022-09-16 PROCEDURE — 124N000001 HC R&B MH

## 2022-09-16 RX ORDER — HYDROXYZINE HYDROCHLORIDE 25 MG/1
25 TABLET, FILM COATED ORAL EVERY 4 HOURS PRN
Status: DISCONTINUED | OUTPATIENT
Start: 2022-09-16 | End: 2022-09-21 | Stop reason: HOSPADM

## 2022-09-16 RX ORDER — GEMFIBROZIL 600 MG/1
600 TABLET, FILM COATED ORAL 2 TIMES DAILY
Status: ON HOLD | COMMUNITY
Start: 2022-02-04 | End: 2022-09-21

## 2022-09-16 RX ORDER — AMOXICILLIN 250 MG
1 CAPSULE ORAL 2 TIMES DAILY PRN
Status: DISCONTINUED | OUTPATIENT
Start: 2022-09-16 | End: 2022-09-21 | Stop reason: HOSPADM

## 2022-09-16 RX ORDER — OLANZAPINE 10 MG/2ML
10 INJECTION, POWDER, FOR SOLUTION INTRAMUSCULAR 3 TIMES DAILY PRN
Status: DISCONTINUED | OUTPATIENT
Start: 2022-09-16 | End: 2022-09-21 | Stop reason: HOSPADM

## 2022-09-16 RX ORDER — METFORMIN HCL 500 MG
2000 TABLET, EXTENDED RELEASE 24 HR ORAL 2 TIMES DAILY WITH MEALS
Status: ON HOLD | COMMUNITY
Start: 2022-08-02 | End: 2022-09-21

## 2022-09-16 RX ORDER — GEMFIBROZIL 600 MG/1
600 TABLET, FILM COATED ORAL DAILY
Status: ON HOLD | COMMUNITY
Start: 2022-01-08 | End: 2022-09-16

## 2022-09-16 RX ORDER — ACETAMINOPHEN 325 MG/1
650 TABLET ORAL EVERY 4 HOURS PRN
Status: DISCONTINUED | OUTPATIENT
Start: 2022-09-16 | End: 2022-09-21 | Stop reason: HOSPADM

## 2022-09-16 RX ORDER — OLANZAPINE 10 MG/1
10 TABLET ORAL 3 TIMES DAILY PRN
Status: DISCONTINUED | OUTPATIENT
Start: 2022-09-16 | End: 2022-09-21 | Stop reason: HOSPADM

## 2022-09-16 RX ORDER — LANOLIN ALCOHOL/MO/W.PET/CERES
3 CREAM (GRAM) TOPICAL
Status: DISCONTINUED | OUTPATIENT
Start: 2022-09-16 | End: 2022-09-21 | Stop reason: HOSPADM

## 2022-09-16 RX ORDER — MAGNESIUM HYDROXIDE/ALUMINUM HYDROXICE/SIMETHICONE 120; 1200; 1200 MG/30ML; MG/30ML; MG/30ML
30 SUSPENSION ORAL EVERY 4 HOURS PRN
Status: DISCONTINUED | OUTPATIENT
Start: 2022-09-16 | End: 2022-09-21 | Stop reason: HOSPADM

## 2022-09-16 RX ORDER — LIRAGLUTIDE 6 MG/ML
1.8 INJECTION SUBCUTANEOUS DAILY
Status: ON HOLD | COMMUNITY
Start: 2022-08-17 | End: 2022-10-10

## 2022-09-16 RX ADMIN — NICOTINE POLACRILEX 2 MG: 2 GUM, CHEWING ORAL at 22:24

## 2022-09-16 ASSESSMENT — ACTIVITIES OF DAILY LIVING (ADL)
ORAL_HYGIENE: INDEPENDENT
DRESS: 0-->INDEPENDENT
TOILETING_ISSUES: NO
EATING: 0-->INDEPENDENT
ADLS_ACUITY_SCORE: 39
SWALLOWING: 0-->SWALLOWS FOODS/LIQUIDS WITHOUT DIFFICULTY
DRESSING/BATHING: BATHING DIFFICULTY, REQUIRES EQUIPMENT
SWALLOWING: 2-->DIFFICULTY SWALLOWING FOODS
FALL_HISTORY_WITHIN_LAST_SIX_MONTHS: NO
DRESSING/BATHING_DIFFICULTY: YES
DOING_ERRANDS_INDEPENDENTLY_DIFFICULTY: NO
EATING: 0-->INDEPENDENT
DRESS: 0-->INDEPENDENT
WALKING_OR_CLIMBING_STAIRS_DIFFICULTY: NO
HYGIENE/GROOMING: INDEPENDENT
BATHING: 1-->ASSISTANCE NEEDED
DRESS: SCRUBS (BEHAVIORAL HEALTH)
CONCENTRATING,_REMEMBERING_OR_MAKING_DECISIONS_DIFFICULTY: NO
WEAR_GLASSES_OR_BLIND: NO
CHANGE_IN_FUNCTIONAL_STATUS_SINCE_ONSET_OF_CURRENT_ILLNESS/INJURY: NO
EATING/SWALLOWING: OTHER (SEE COMMENTS)
DIFFICULTY_EATING/SWALLOWING: YES

## 2022-09-16 NOTE — TELEPHONE ENCOUNTER
S: 2:08pm Irma ([id number]) w/ [facility] called Intake w/ clinical on a 47/M present in their ED.    B:  The pt is currently at the [facility] ED ([id number]). Pt is presenting with increased paranoia and delusional thinking for several months. Pt reports having SI and have made comments about shooting himself. Pt reports an increase in his invega injection on Wednesday but reports it has not helped.     Guardian: Self    The pts MH Hx is as follows: Schizoaffective d/o, Bipolar type.     The pt denies using any substances.    The pt has been IP for MH before.    There is no concern for aggression this visit. There is no concern for HI.     Per  the pt can ambulate independently.     Per  the pt is indep with ADLs.    The pt does not have any known medical concerns. - Type 2 diabetes.     Covid -Negative  Utox -Negative    A: 72HH    R: The pt is currently in the [facility] awaiting bed placement.    2:17pm Pt has been added to the work-list. Intake waiting labs for bed placement. Intake working on identifying appropriate bed placement.     2:28pm intake called [facility] providerMolly. Intake left a VM.     2:38pm Intake received a called provider Molly. Pt has been accepted for placement on [address]/[facility].     3:21pm Intake called [address]. Charge is in a meeting.     3:45pm Intake called [address] and provided disposition to charge nurse, Nina.     3:49pm Intake called [facility] Irma WANG, and provided placement information.

## 2022-09-17 LAB
GLUCOSE BLDC GLUCOMTR-MCNC: 129 MG/DL (ref 70–99)
GLUCOSE BLDC GLUCOMTR-MCNC: 141 MG/DL (ref 70–99)
GLUCOSE BLDC GLUCOMTR-MCNC: 74 MG/DL (ref 70–99)

## 2022-09-17 PROCEDURE — 99223 1ST HOSP IP/OBS HIGH 75: CPT | Mod: AI | Performed by: NURSE PRACTITIONER

## 2022-09-17 PROCEDURE — 124N000001 HC R&B MH

## 2022-09-17 PROCEDURE — 250N000013 HC RX MED GY IP 250 OP 250 PS 637: Performed by: NURSE PRACTITIONER

## 2022-09-17 PROCEDURE — 250N000012 HC RX MED GY IP 250 OP 636 PS 637: Performed by: NURSE PRACTITIONER

## 2022-09-17 RX ORDER — NICOTINE 21 MG/24HR
1 PATCH, TRANSDERMAL 24 HOURS TRANSDERMAL DAILY
Status: DISCONTINUED | OUTPATIENT
Start: 2022-09-17 | End: 2022-09-21 | Stop reason: HOSPADM

## 2022-09-17 RX ORDER — LIRAGLUTIDE 6 MG/ML
1.8 INJECTION SUBCUTANEOUS DAILY
Status: DISCONTINUED | OUTPATIENT
Start: 2022-09-17 | End: 2022-09-21 | Stop reason: HOSPADM

## 2022-09-17 RX ORDER — NICOTINE POLACRILEX 4 MG
15-30 LOZENGE BUCCAL
Status: DISCONTINUED | OUTPATIENT
Start: 2022-09-17 | End: 2022-09-21 | Stop reason: HOSPADM

## 2022-09-17 RX ORDER — DEXTROSE MONOHYDRATE 25 G/50ML
25-50 INJECTION, SOLUTION INTRAVENOUS
Status: DISCONTINUED | OUTPATIENT
Start: 2022-09-17 | End: 2022-09-21 | Stop reason: HOSPADM

## 2022-09-17 RX ORDER — PALIPERIDONE 1.5 MG/1
1.5 TABLET, EXTENDED RELEASE ORAL DAILY
Status: DISCONTINUED | OUTPATIENT
Start: 2022-09-17 | End: 2022-09-17

## 2022-09-17 RX ORDER — GEMFIBROZIL 600 MG/1
600 TABLET, FILM COATED ORAL DAILY
Status: DISCONTINUED | OUTPATIENT
Start: 2022-09-17 | End: 2022-09-21 | Stop reason: HOSPADM

## 2022-09-17 RX ORDER — RISPERIDONE 1 MG/1
1 TABLET, ORALLY DISINTEGRATING ORAL DAILY
Status: DISCONTINUED | OUTPATIENT
Start: 2022-09-17 | End: 2022-09-18

## 2022-09-17 RX ORDER — ASPIRIN 81 MG/1
81 TABLET ORAL DAILY
Status: DISCONTINUED | OUTPATIENT
Start: 2022-09-17 | End: 2022-09-20

## 2022-09-17 RX ORDER — METFORMIN HCL 500 MG
500 TABLET, EXTENDED RELEASE 24 HR ORAL DAILY
Status: DISCONTINUED | OUTPATIENT
Start: 2022-09-17 | End: 2022-09-20

## 2022-09-17 RX ADMIN — NICOTINE POLACRILEX 2 MG: 2 GUM, CHEWING ORAL at 06:17

## 2022-09-17 RX ADMIN — GEMFIBROZIL 600 MG: 600 TABLET ORAL at 08:55

## 2022-09-17 RX ADMIN — NICOTINE POLACRILEX 2 MG: 2 GUM, CHEWING ORAL at 13:13

## 2022-09-17 RX ADMIN — RISPERIDONE 1 MG: 1 TABLET, ORALLY DISINTEGRATING ORAL at 13:56

## 2022-09-17 RX ADMIN — INSULIN GLARGINE 70 UNITS: 100 INJECTION, SOLUTION SUBCUTANEOUS at 20:39

## 2022-09-17 RX ADMIN — NICOTINE POLACRILEX 2 MG: 2 GUM, CHEWING ORAL at 17:26

## 2022-09-17 RX ADMIN — ASPIRIN 81 MG: 81 TABLET, COATED ORAL at 08:51

## 2022-09-17 RX ADMIN — NICOTINE 1 PATCH: 21 PATCH, EXTENDED RELEASE TRANSDERMAL at 13:32

## 2022-09-17 RX ADMIN — NICOTINE POLACRILEX 2 MG: 2 GUM, CHEWING ORAL at 09:12

## 2022-09-17 ASSESSMENT — ACTIVITIES OF DAILY LIVING (ADL)
ADLS_ACUITY_SCORE: 39
HYGIENE/GROOMING: INDEPENDENT
ADLS_ACUITY_SCORE: 39
DRESS: SCRUBS (BEHAVIORAL HEALTH)
ADLS_ACUITY_SCORE: 39
ADLS_ACUITY_SCORE: 39
ORAL_HYGIENE: INDEPENDENT
ADLS_ACUITY_SCORE: 39
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
## 2022-09-17 NOTE — PLAN OF CARE
Problem: Behavioral Health Plan of Care  Goal: Patient-Specific Goal (Individualization)  Description: Pt will sleep at least 6-8 hours   Pt will eat at least 50% of meals   Pt will be medication compliant   Pt will follow treatment team recommendations   Pt will attend at least 50% of groups       Outcome: Ongoing, Progressing  Note: 15:40: Received end of shift report from Neisha SARGENT RN.      20:40: Accucheck 141; Scheduled lantus given to LUQ, blunted affect.      20:45: Withdrawn activity via out the shift, did participate in a couple afternoon groups, denies all mental health criteria or need for prn pharmacological intervention for the exception of nicotine gum this shift  Mistrustful/suspicisous demeanor, restricted communication, appears to be preoccupied by internal stimuli. Delayed responses. Offers no complaints or concerns at this time; pt is in agreement to notify staff unable to sleep or any changes in condition.     Face to face end of shift report to be communicated to on-coming [facility] staff.     Betsey Sanders RN  9/17/2022  9:23 PM             Problem: Suicide Risk  Goal: Absence of Self-Harm  Description: Pt will be safe from self injury while on the unit   Pt will seek out staff if feeling an increase in suicidal ideations   Pt will report decrease in SI by the time of discharge       Outcome: Ongoing, Progressing     Problem: Suicidal Behavior  Goal: Suicidal Behavior is Absent or Managed  Outcome: Ongoing, Progressing   Goal Outcome Evaluation:

## 2022-09-17 NOTE — PROGRESS NOTES
09/16/22 2210   Patient Belongings   Did you bring any home meds/supplements to the hospital?  No   Patient Belongings sent to security per site process;none;locker   Patient Belongings Put in Hospital Secure Location (Security or Locker, etc.)   (Underwear, socks, shoes, shirt, jeans, belt, cigarette box,cigarettes, lighter, wallet w/misc papers)   Belongings Search Yes   Clothing Search Yes   Second Staff Matthew DUPREE   List items sent to safe: stack of pay stubs, smartphone, w/case and small crack, key ring w/14keys, [place]  licence, debit card, social security card, cravie card x2, [place] insurance card x6, [employer] cross card, membership card, [employer]mill card, DNR id, rewards card,$20 bill x5,$1 bills x12, $3.09 in change, total of $115.09 total cash.  (contraband - two pocket knives)   All other belongings put in assigned cubby in belongings room.       I have reviewed my belongings list on admission and verify that it is correct.     Patient signature_______________________________    Second staff witness (if patient unable to sign) ______________________________       I have received all my belongings at discharge.    Patient signature________________________________    CRYSTAL   9/16/2022  10:14 PM
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
## 2022-09-17 NOTE — PLAN OF CARE
Problem: Behavioral Health Plan of Care  Goal: Patient-Specific Goal (Individualization)  Description: Pt will sleep at least 6-8 hours   Pt will eat at least 50% of meals   Pt will be medication compliant   Pt will follow treatment team recommendations   Pt will attend at least 50% of groups       Outcome: Ongoing, Progressing  Note: Report received from Janell. Rounding complete. Pt observed sleeping in left side lying position with regular and unlabored respirations.    Pt has been in bed with eyes closed and regular respirations. 15 minute and PRN checks all night. No complaints offered. Will continue to monitor.    Writer was able to confirm PTA meds with pt after checking BS which was 74 at approx 0613.  Pt stated he usually checks his BS BID in the morning and before HS lantus. Pt also would like to speak to a diabetic educator or dietician regarding his diet. He endorsed doing the keto diet which writer did advise per studies is not ideal for diabetics. Writer did educate on carb count and trying to balance fiber content to carb intake to help lose weight with DM 2.    Pt slept approx  6.75  hours this NOC shift.    Face to face end of shift report communicated to oncoming RN.    Jen BLACK RN  September 17, 2022  4:06 AM          Problem: Suicide Risk  Goal: Absence of Self-Harm  Description: Pt will be safe from self injury while on the unit   Pt will seek out staff if feeling an increase in suicidal ideations   Pt will report decrease in SI by the time of discharge       Outcome: Ongoing, Progressing  Note: Unable to assess due to pt sleeping. Pt has remained free of self-harm.       Problem: Suicidal Behavior  Goal: Suicidal Behavior is Absent or Managed  Outcome: Ongoing, Progressing  Note: Unable to assess due to pt sleeping. Pt has remained free of self-harm.     Goal Outcome Evaluation:

## 2022-09-17 NOTE — H&P
"[facility] PSYCHIATRY   HISTORY AND PHYSICAL     ADMISSION DATA     Bandar Lawson MRN# [id number]   Age: 47 year old YOB: 1975     Date of Admission: 9/16/2022  Primary Physician: No Ref-Primary, Physician        CHIEF COMPLAINT   suicidal thoughts       HISTORY OF PRESENT ILLNESS     Patient is a 47-year-old male presenting to the emergency department accompanied by parents with complaint of crisis related to his schizoaffective disorder bipolar type. Parents report the patient has had increased problems over the last several months with worsening paranoia and delusional thinking. Patient has had suicidal ideations and made comments about shooting himself. Patient lives with his sisters and there are no firearms within the home. Patient just had an increase in his Invega injection which she last received on Wednesday, September 14th, up to 156 mg in an effort to help \"quiet his mind\", but it has not been effective. Patient's delusional and paranoid thinking has been persistent at work with him describing a woman who followed him from his previous workplace of 2 years ago in [place] and is causing crisis to the point that patient is having difficulty functioning at home as well. Parents report that the last time patient had symptoms this severe was when he was last hospitalized. Patient denies any alcohol or illicit drug use. Patient denies any fever or chills. No cough or breathing difficulty. Past medical history is significant for obesity, type 2 diabetes mellitus, and tobacco use disorder. No other complaints or concerns at this time       PSYCHIATRIC HISTORY     Patient sees Joyce Nick outpatient for medication management.        SUBSTANCE USE HISTORY   History   Drug Use No       Social History    Substance and Sexual Activity      Alcohol use: No      History   Smoking Status     Current Every Day Smoker     Packs/day: 1.00   Smokeless Tobacco     Never Used     Denies use of alcohol or " illicit drugs       SOCIAL HISTORY   Social History     Socioeconomic History     Marital status: Single     Spouse name: Not on file     Number of children: Not on file     Years of education: Not on file     Highest education level: Not on file   Occupational History     Not on file   Tobacco Use     Smoking status: Current Every Day Smoker     Packs/day: 1.00     Smokeless tobacco: Never Used   Substance and Sexual Activity     Alcohol use: No     Drug use: No     Sexual activity: Not on file   Other Topics Concern     Parent/sibling w/ CABG, MI or angioplasty before 65F 55M? Not Asked   Social History Narrative     Not on file     Social Determinants of Health     Financial Resource Strain: Not on file   Food Insecurity: Not on file   Transportation Needs: Not on file   Physical Activity: Not on file   Stress: Not on file   Social Connections: Not on file   Intimate Partner Violence: Not on file   Housing Stability: Not on file     Patient resides in [place] with his sister, employed full time at [employer] working nights, single, and enjoys fishing and riding motorcycle.       FAMILY HISTORY   Family History   Problem Relation Age of Onset     Diabetes Brother          PAST MEDICAL HISTORY   No past medical history on file.    No past surgical history on file.    Patient has no known allergies.     MEDICATIONS   Prior to Admission medications    Medication Sig Start Date End Date Taking? Authorizing Provider   aspirin (ASA) 81 MG EC tablet Take 81 mg by mouth daily 10/30/20  Yes Reported, Patient   empagliflozin (JARDIANCE) 25 MG TABS tablet Take 25 mg by mouth daily 9/2/21  Yes Reported, Patient   gemfibrozil (LOPID) 600 MG tablet Take 600 mg by mouth 2 times daily Per pt only takes once daily not BID 2/4/22  Yes Reported, Patient   insulin glargine (LANTUS PEN) 100 UNIT/ML pen Inject 70 Units Subcutaneous At Bedtime 8/12/22  Yes Reported, Patient   liraglutide (VICTOZA) 18 MG/3ML solution Inject 1.8 mg  "Subcutaneous daily 8/17/22  Yes Reported, Patient   metFORMIN (GLUCOPHAGE XR) 500 MG 24 hr tablet Take 500 mg by mouth daily 8/2/22  Yes Reported, Patient   paliperidone (INVEGA SUSTENNA) 156 MG/ML ALIDA injection Inject 156 mg into the muscle every 30 days Last given 9/14/2022 9/14/22  Yes Reported, Patient        PHYSICAL EXAM/ROS     I have reviewed the physical exam as documented by the medical team and agree with findings and assessment and have no additional findings to add at this time. The review of systems is negative other than noted in the HPI.       LABS   Recent Results (from the past 24 hour(s))   COVID-19 VIRUS (CORONAVIRUS) BY PCR (EXTERNAL RESULT)    Collection Time: 09/16/22 11:53 AM   Result Value Ref Range    COVID-19 Virus by PCR (External Result) Negative Negative/Not Detected   Glucose by meter    Collection Time: 09/17/22  6:12 AM   Result Value Ref Range    GLUCOSE BY METER POCT 74 70 - 99 mg/dL         MENTAL STATUS EXAM   Vitals: /78   Pulse 79   Temp 96.9  F (36.1  C) (Temporal)   Resp 14   Ht 1.854 m (6' 1\")   Wt 119.5 kg (263 lb 6.4 oz)   SpO2 94%   BMI 34.75 kg/m      Appearance:  awake, alert  Attitude:  cooperative  Eye Contact:  good  Mood:  paranoid  Affect:  mood congruent  Speech:  clear, coherent  Psychomotor Behavior:  no evidence of tardive dyskinesia, dystonia, or tics  Thought Process:  tangental  Associations:  loosening of associations present  Thought Content:  no evidence of suicidal ideation or homicidal ideation and auditory hallucinations present  Insight:  fair  Judgment:  fair  Oriented to:  time, person, and place  Attention Span and Concentration:  intact  Recent and Remote Memory:  intact  Language: Able to name objects, Able to repeat phrases and Able to read and write  Fund of Knowledge: appropriate  Muscle Strength and Tone: normal  Gait and Station: Normal       ASSESSMENT     Patient reports feeling better being here as he has been going to " "groups and keeping busy.  He reports at work there are guys who are teasing him about a girl that he was supposed to meet when he lived in [place], [place] about 5 years ago and wonders how they know about this.  He denies depressed mood, denies anxiety per say, and denies suicidal thoughts \"right now\" and endorses he had thoughts earlier.  Patient reports hearing voices at times and having increased paranoia lately, often feeling worried that someone will hurt him.  He reports that he likes his job but thinks he needs a break from it for awhile.  Patient had recent increase in Invega CUTLER having received 156mg this past Wednesday (9/14).  He agrees to try a bridge with oral invega.  He works nights so sleep may be messed up while on the unit.  So far he has been up today, reports he may lay down to sleep after a bit.  Patient would like an appointment with Joyce Nick in [place] before discharging.       DIAGNOSIS     1. Schizoaffective Disorder, bipolar type       PLAN     Location: Unit 5  Legal Status: Orders Placed This Encounter      Voluntary    Safety Assessment:    Behavioral Orders   Procedures     Code 1 - Restrict to Unit     Routine Programming     As clinically indicated     Status 15     Every 15 minutes.      PTA medications held:     - None    PTA medications continued/changed:     - Continue all PTAs as prescribed    New medications initiated:     - Oral invega 1mg daily    Programming: Patient will be treated in a therapeutic milieu with appropriate individual and group therapies. Education will be provided on diagnoses, medications, and treatments.     Medical diagnoses:  Per medicine    Consult: none  Tests: none    Anticipated LOS: 1-3 days  Disposition: Home when stable       ATTESTATION      MAYNOR Lucas CNP                 "

## 2022-09-18 LAB
GLUCOSE BLDC GLUCOMTR-MCNC: 128 MG/DL (ref 70–99)
GLUCOSE BLDC GLUCOMTR-MCNC: 75 MG/DL (ref 70–99)
GLUCOSE BLDC GLUCOMTR-MCNC: 89 MG/DL (ref 70–99)

## 2022-09-18 PROCEDURE — 250N000013 HC RX MED GY IP 250 OP 250 PS 637: Performed by: NURSE PRACTITIONER

## 2022-09-18 PROCEDURE — 124N000001 HC R&B MH

## 2022-09-18 PROCEDURE — 99232 SBSQ HOSP IP/OBS MODERATE 35: CPT | Performed by: NURSE PRACTITIONER

## 2022-09-18 PROCEDURE — 250N000009 HC RX 250: Performed by: NURSE PRACTITIONER

## 2022-09-18 RX ORDER — RISPERIDONE 1 MG/1
1 TABLET, ORALLY DISINTEGRATING ORAL 2 TIMES DAILY
Status: DISCONTINUED | OUTPATIENT
Start: 2022-09-18 | End: 2022-09-21 | Stop reason: HOSPADM

## 2022-09-18 RX ADMIN — NICOTINE POLACRILEX 2 MG: 2 GUM, CHEWING ORAL at 13:25

## 2022-09-18 RX ADMIN — LIRAGLUTIDE 1.8 MG: 6 INJECTION SUBCUTANEOUS at 08:45

## 2022-09-18 RX ADMIN — NICOTINE POLACRILEX 2 MG: 2 GUM, CHEWING ORAL at 12:02

## 2022-09-18 RX ADMIN — ASPIRIN 81 MG: 81 TABLET, COATED ORAL at 08:39

## 2022-09-18 RX ADMIN — GEMFIBROZIL 600 MG: 600 TABLET ORAL at 08:43

## 2022-09-18 RX ADMIN — NICOTINE POLACRILEX 2 MG: 2 GUM, CHEWING ORAL at 19:56

## 2022-09-18 RX ADMIN — EMPAGLIFLOZIN 25 MG: 25 TABLET, FILM COATED ORAL at 08:40

## 2022-09-18 RX ADMIN — NICOTINE 1 PATCH: 21 PATCH, EXTENDED RELEASE TRANSDERMAL at 08:40

## 2022-09-18 RX ADMIN — RISPERIDONE 1 MG: 1 TABLET, ORALLY DISINTEGRATING ORAL at 19:54

## 2022-09-18 RX ADMIN — RISPERIDONE 1 MG: 1 TABLET, ORALLY DISINTEGRATING ORAL at 08:39

## 2022-09-18 ASSESSMENT — ACTIVITIES OF DAILY LIVING (ADL)
ADLS_ACUITY_SCORE: 39
HYGIENE/GROOMING: INDEPENDENT
ADLS_ACUITY_SCORE: 39
ORAL_HYGIENE: INDEPENDENT
ADLS_ACUITY_SCORE: 39
DRESS: SCRUBS (BEHAVIORAL HEALTH)
ADLS_ACUITY_SCORE: 39

## 2022-09-18 NOTE — PLAN OF CARE
Problem: Behavioral Health Plan of Care  Goal: Patient-Specific Goal (Individualization)  Description: Pt will sleep at least 6-8 hours   Pt will eat at least 50% of meals   Pt will be medication compliant   Pt will follow treatment team recommendations   Pt will attend at least 50% of groups     Outcome: Ongoing, Progressing    A&O, VSS, denies pain.   Denies all criteria including: SI, SIB, HI or hallucinations.  Pt verbalizes that he has intrusive and racing thoughts that kept him up last night though nights reports pt slept well. Pt continues to talk about the woman he was stood up by and how everybody at work is talking about him.  Pt is guarded and hesitant to converse.    Declines Metformin again this AM-much education given to pt about need to take as scheduled to get benefit.    Meets goal by attending group this afternoon.--plays Yahtzee with staff and peers. Encourage pt to spend more time out of room.   Pt focused on discharge.      Problem: Suicide Risk  Goal: Absence of Self-Harm  Description: Pt will be safe from self injury while on the unit   Pt will seek out staff if feeling an increase in suicidal ideations   Pt will report decrease in SI by the time of discharge       Outcome: Ongoing, Progressing     Problem: Suicidal Behavior  Goal: Suicidal Behavior is Absent or Managed  Outcome: Ongoing, Progressing   Goal Outcome Evaluation:    Plan of Care Reviewed With: patient      Face to face end of shift report communicated to oncoming shift.     Neisha Dudley RN  9/18/2022  9:53 AM

## 2022-09-18 NOTE — PLAN OF CARE
Problem: Behavioral Health Plan of Care  Goal: Patient-Specific Goal (Individualization)  Description: Pt will sleep at least 6-8 hours   Pt will eat at least 50% of meals   Pt will be medication compliant   Pt will follow treatment team recommendations   Pt will attend at least 50% of groups       Outcome: Ongoing, Progressing  Note: Report received from Betsey. Rounding complete. Pt observed sleeping in left side lying position with regular and unlabored respirations.    0208- PRN accu taken due to pt being concerned about 74 BS on 09/17/22 after not having diabetic meds the night before. Pt accu-check at this time was 128. Pt happy it was not what he considers low again. Pt pleasant and cooperative at interaction with writer.     0720- AM BS 89    Pt has been in bed with eyes closed and regular respirations. 15 minute and PRN checks all night. No complaints offered. Will continue to monitor.    Pt slept approx  7  hours this NOC shift.    Face to face end of shift report communicated to oncoming RN.    Jen BLACK RN  September 18, 2022  2:18 AM       Problem: Suicide Risk  Goal: Absence of Self-Harm  Description: Pt will be safe from self injury while on the unit   Pt will seek out staff if feeling an increase in suicidal ideations   Pt will report decrease in SI by the time of discharge       Outcome: Ongoing, Progressing  Note: Pt has remained free of injury and self harm this NOC shift     Problem: Suicidal Behavior  Goal: Suicidal Behavior is Absent or Managed  Outcome: Ongoing, Progressing      Goal Outcome Evaluation:

## 2022-09-18 NOTE — PROGRESS NOTES
"[facility] PSYCHIATRY  PROGRESS NOTE     SUBJECTIVE     Patient is resting in his bed.  He reports voices are \"not better\" with racing thoughts.  He reports not sleeping well last night - although according to staff it appears patient slept much of the night and was sleeping yesterday.  He does work nights so his schedule will be messed up.  He denies outright suicidal thoughts and agrees to add dose risperidone for bedtime.       MEDICATIONS   Scheduled Meds:    aspirin  81 mg Oral Daily     empagliflozin  25 mg Oral Daily     gemfibrozil  600 mg Oral Daily     insulin glargine  70 Units Subcutaneous At Bedtime     liraglutide  1.8 mg Subcutaneous Daily     metFORMIN  500 mg Oral Daily     nicotine  1 patch Transdermal Daily     nicotine   Transdermal Q8H     [START ON 10/12/2022] paliperidone  156 mg Intramuscular Q30 Days     risperiDONE  1 mg Sublingual BID     PRN Meds:.acetaminophen, alum & mag hydroxide-simethicone, glucose **OR** dextrose **OR** glucagon, hydrOXYzine, melatonin, nicotine, OLANZapine **OR** OLANZapine, senna-docusate     ALLERGIES   No Known Allergies     MENTAL STATUS EXAM   Vitals: /79   Pulse 98   Temp 97.8  F (36.6  C) (Temporal)   Resp 12   Ht 1.854 m (6' 1\")   Wt 119.5 kg (263 lb 6.4 oz)   SpO2 96%   BMI 34.75 kg/m      Appearance:  awake, alert  Attitude:  cooperative  Eye Contact:  good  Mood:  depressed  Affect:  intensity is flat  Speech:  clear, coherent  Psychomotor Behavior:  no evidence of tardive dyskinesia, dystonia, or tics  Thought Process:  disorganized  Associations:  loosening of associations present  Thought Content:  no evidence of suicidal ideation or homicidal ideation and auditory hallucinations present  Insight:  fair  Judgment:  fair  Oriented to:  time, person, and place  Attention Span and Concentration:  fair  Recent and Remote Memory:  fair  Language: Able to name objects, Able to repeat phrases and Able to read and write  Fund of Knowledge: " "low-normal  Muscle Strength and Tone: normal  Gait and Station: Normal       LABS   Recent Results (from the past 24 hour(s))   Glucose by meter    Collection Time: 09/17/22  8:38 PM   Result Value Ref Range    GLUCOSE BY METER POCT 141 (H) 70 - 99 mg/dL   Glucose by meter    Collection Time: 09/18/22  2:07 AM   Result Value Ref Range    GLUCOSE BY METER POCT 128 (H) 70 - 99 mg/dL   Glucose by meter    Collection Time: 09/18/22  7:20 AM   Result Value Ref Range    GLUCOSE BY METER POCT 89 70 - 99 mg/dL         IMPRESSION     Patient is a 47-year-old male presenting to the emergency department accompanied by parents with complaint of crisis related to his schizoaffective disorder bipolar type. Parents report the patient has had increased problems over the last several months with worsening paranoia and delusional thinking. Patient has had suicidal ideations and made comments about shooting himself. Patient lives with his sisters and there are no firearms within the home. Patient just had an increase in his Invega injection which she last received on Wednesday, September 14th, up to 156 mg in an effort to help \"quiet his mind\", but it has not been effective. Patient's delusional and paranoid thinking has been persistent at work with him describing a woman who followed him from his previous workplace of 2 years ago in [place] and is causing crisis to the point that patient is having difficulty functioning at home as well. Parents report that the last time patient had symptoms this severe was when he was last hospitalized. Patient denies any alcohol or illicit drug use. Patient denies any fever or chills. No cough or breathing difficulty. Past medical history is significant for obesity, type 2 diabetes mellitus, and tobacco use disorder. No other complaints or concerns at this time       DIAGNOSES     1.Schizoaffective Disorder, bipolar type       PLAN     Location: Unit 5  Legal Status: Orders Placed This Encounter     "  Voluntary    Safety Assessment:    Behavioral Orders   Procedures     Code 1 - Restrict to Unit     Routine Programming     As clinically indicated     Status 15     Every 15 minutes.      PTA medications continued/changed:      - Continue all PTAs as prescribed     New medications initiated:      - Oral risperidone 1mg daily ODT    Today's Changes:    - Add dose risperidone 1mg at bedtime (bid)    Programming: Patient will be treated in a therapeutic milieu with appropriate individual and group therapies. Education will be provided on diagnoses, medications, and treatments.     Medical diagnoses:  Per medicine    Consult: None  Tests: None    Anticipated LOS: 2-5 days  Disposition: home when stable       TREATMENT TEAM CARE PLAN     Progress: Continued symptoms.    Continued Stay Criteria/Rationale: Continued symptoms without sufficient improvement/resolution.    Medical/Physical: See above.    Precautions: See above.     Plan: Continue inpatient care with unit support and medication management.    Rationale for change in precautions or plan: NA due to no change.    Participants: MAYNOR Lucas CNP, Nursing, SW, OT.    The patient's care was discussed with the treatment team and chart notes were reviewed.       ATTESTATION      MAYNOR Lucas CNP

## 2022-09-18 NOTE — PLAN OF CARE
"  Problem: Behavioral Health Plan of Care  Goal: Patient-Specific Goal (Individualization)  Description: Pt will sleep at least 6-8 hours   Pt will eat at least 50% of meals   Pt will be medication compliant   Pt will follow treatment team recommendations   Pt will attend at least 50% of groups       Outcome: Ongoing, Not Progressing  Note: 15:40: Received end of shift report from Neisha SARGENT RN. Napping in bed upon arrival--      18:00: Poor appetite, out briefly for dinner, ate a few bites, denies need for prn pharmacological intervention @ present-- Pt withdrawn et isolative, less delayed.     20:00: Accucheck of 75; Scheduled HS lantus HELD per pt request r/t AM victoza et AM jardiance administration. Pt fixates on discharging, \"work is pretty busy lately\" requesting to speak with \"Joyce\" ---    23:00: Pt out for snack, returned to room for be, continues guarded/mistrustful behavior, less paranoia.     Face to face end of shift report communicated to on-coming [facility] staff.     Betsey Sanders RN  9/19/2022  12:14 AM               Problem: Suicide Risk  Goal: Absence of Self-Harm  Description: Pt will be safe from self injury while on the unit   Pt will seek out staff if feeling an increase in suicidal ideations   Pt will report decrease in SI by the time of discharge       Outcome: Ongoing, Progressing     Problem: Suicidal Behavior  Goal: Suicidal Behavior is Absent or Managed  Outcome: Ongoing, Progressing   Goal Outcome Evaluation:    Plan of Care Reviewed With: patient                 "

## 2022-09-19 LAB
GLUCOSE BLDC GLUCOMTR-MCNC: 145 MG/DL (ref 70–99)
GLUCOSE BLDC GLUCOMTR-MCNC: 81 MG/DL (ref 70–99)

## 2022-09-19 PROCEDURE — 250N000013 HC RX MED GY IP 250 OP 250 PS 637: Performed by: NURSE PRACTITIONER

## 2022-09-19 PROCEDURE — 99233 SBSQ HOSP IP/OBS HIGH 50: CPT | Performed by: STUDENT IN AN ORGANIZED HEALTH CARE EDUCATION/TRAINING PROGRAM

## 2022-09-19 PROCEDURE — 124N000001 HC R&B MH

## 2022-09-19 RX ORDER — BLOOD SUGAR DIAGNOSTIC
STRIP MISCELLANEOUS
COMMUNITY
Start: 2022-08-02

## 2022-09-19 RX ORDER — PEN NEEDLE, DIABETIC 31 GX5/16"
NEEDLE, DISPOSABLE MISCELLANEOUS
COMMUNITY
Start: 2022-09-12

## 2022-09-19 RX ADMIN — ASPIRIN 81 MG: 81 TABLET, COATED ORAL at 09:03

## 2022-09-19 RX ADMIN — NICOTINE POLACRILEX 2 MG: 2 GUM, CHEWING ORAL at 15:56

## 2022-09-19 RX ADMIN — GEMFIBROZIL 600 MG: 600 TABLET ORAL at 09:03

## 2022-09-19 RX ADMIN — METFORMIN HYDROCHLORIDE 500 MG: 500 TABLET, EXTENDED RELEASE ORAL at 09:03

## 2022-09-19 RX ADMIN — NICOTINE POLACRILEX 2 MG: 2 GUM, CHEWING ORAL at 23:10

## 2022-09-19 RX ADMIN — RISPERIDONE 1 MG: 1 TABLET, ORALLY DISINTEGRATING ORAL at 09:03

## 2022-09-19 RX ADMIN — EMPAGLIFLOZIN 25 MG: 25 TABLET, FILM COATED ORAL at 09:03

## 2022-09-19 RX ADMIN — NICOTINE 1 PATCH: 21 PATCH, EXTENDED RELEASE TRANSDERMAL at 11:28

## 2022-09-19 RX ADMIN — RISPERIDONE 1 MG: 1 TABLET, ORALLY DISINTEGRATING ORAL at 20:39

## 2022-09-19 RX ADMIN — NICOTINE POLACRILEX 2 MG: 2 GUM, CHEWING ORAL at 10:33

## 2022-09-19 RX ADMIN — INSULIN GLARGINE 70 UNITS: 100 INJECTION, SOLUTION SUBCUTANEOUS at 20:39

## 2022-09-19 ASSESSMENT — ACTIVITIES OF DAILY LIVING (ADL)
HYGIENE/GROOMING: INDEPENDENT
ADLS_ACUITY_SCORE: 39
ORAL_HYGIENE: INDEPENDENT
ADLS_ACUITY_SCORE: 39
ADLS_ACUITY_SCORE: 39
DRESS: SCRUBS (BEHAVIORAL HEALTH);INDEPENDENT
ADLS_ACUITY_SCORE: 39
ADLS_ACUITY_SCORE: 39
LAUNDRY: UNABLE TO COMPLETE
ADLS_ACUITY_SCORE: 39

## 2022-09-19 NOTE — CARE PLAN
"Prior to Admission Medication Reconciliation:     Medications added:   [x] None  [] As listed below:    Medications deleted:   [x] None  [] As listed below:    Medications marked for review/removal by attending:  [x] None  [] As listed below:    Changes made to existing medications:   [] None  [x] Updated strengths and frequencies to most current.   [x] As listed below:    Metformin from 1 tab daily to 2 tabs BID (prescribed)    Pertinent notes/medications patient takes different than prescribed:     Lopid- prescribed BID, pt only takes once per day WHEN he takes it. Doesn't take every day.     jardiance and metformin- pt reports crushing in order to take. Can't swallow pills.    Metformin- takes 1 tablet daily, sometimes. Does not take every day, nor as prescribed. Reason: \"laziness\".     Asa- takes at bedtime     Lantus- prescribed 75 units daily. Pt reports it is 70 units at HS. Does not take when BG is < 100.     Last times/dates taken verified with patient:  [] Yes- completed myself  [] Prepared PTA medlist for review only. (will not be available to review personally)  [] Did not review with patient. Rx verification only. Review completed by nursing.    [x] Nurse completed no changes made (double checked entries)  [] Unable to review with patient at this time:    Allergies listed at another location:  []Allergies match allergies listed in Epic  []Other allergies listed:    Allergy review:    []Did not review: reviewed by nursing  []Did not review: pt unable at this time  [x]Verified current existing allergies or NKDA: no changes made   []Patient confirmed current existing allergies and new allergies added:    Medication reconciliation sources:   [x]Patient  []Patient family member/emergency contact: **  [][facility] Report Review  [x]Epic Chart Review  [x]Care Everywhere review: see Rina and Alena    []Pharmacy med list/phone call: **  [x]Outside meds dispense report: see below  []Nursing home or Assisted " Living MAR:  []Other: **    Pharmacy desired at discharge: [facility]    Is patient on coumadin?   [x]No  []Yes    Requests for consultation by provider or pharmacist:   [x] Patient understands why all of their meds were prescribed and how to take them. No questions.   [] Managing party has no questions.   [] Patient/ managing party has questions about the following:  [] Did not review with patient. Cannot assess.     Fill dates and reported compliancy:  [x] Fill dates coincide with reported compliancy for all/most maintenance meds.   [] Not applicable. Patient is not taking any maintenance medications at this time.   [] Fill dates do not coincide with compliancy with maintenance meds. See notes in PTA medlist and in comments.    [] Fill dates do not coincide with the following medications but pt reports compliancy:  [] Did not review with patient. Cannot assess.     Historian accuracy:  [] Excellent- alert and oriented, understands why meds were prescribed and how to take, able to answer specifics  [x] Good- alert and oriented, understands why meds were prescribed and how to take, some confusion   [] Fair- alert and oriented, doesn't know medications without list, cannot answer specifics about medications, but has a decent process for which to take at home  [] Poor- does not know medications, may not have a process to take at home, may be cognitively unable to review at this time  []Medication management done by family member or facility, no concerns about historian accuracy.   [] Did not review with patient. Cannot assess.     Medication Management:  [x] Manages meds independently  [] Family member/ other party manages meds/assists:  [] Meds managed by staff at facility  [] Meds set up by home care, family/other party helps administer  [] Meds set up by home care, self administers  [] Did not review with patient. Cannot assess.     Other medications aside from PTA:  [x] Denies taking any medications aside from  those listed in PTA meds  [] Reports taking another medication(s) but cannot recall the name(s)  [] Refuses to say.  [] Did not review with patient. Cannot assess.     Comments: pt reports non-compliancy on the weekends.     Gillian Bowman on 9/19/2022 at 9:50 AM       Notifying appropriate party of changes/additions/discrepancies:  [x]No pertinent changes made, notification not necessary.   [] Notified attending provider via text page/phone call  [] Notified attending provider in person  [] Notified pharmacy  [] Notified nurse  [] Medications have not been reconciled by a provider yet, notification not necessary  [] Pt is not admitted to floor yet, PTA meds completed before admission.     Medications Prior to Admission   Medication Sig Dispense Refill Last Dose     aspirin (ASA) 81 MG EC tablet Take 81 mg by mouth At Bedtime   More than a month at Unknown time     empagliflozin (JARDIANCE) 25 MG TABS tablet Take 25 mg by mouth daily (Patient crushes med to take.)   9/16/2022 at Unknown time     gemfibrozil (LOPID) 600 MG tablet Take 600 mg by mouth 2 times daily   9/16/2022 at Unknown time     insulin glargine (LANTUS PEN) 100 UNIT/ML pen Inject 70 Units Subcutaneous At Bedtime   9/15/2022 at Unknown time     liraglutide (VICTOZA) 18 MG/3ML solution Inject 1.8 mg Subcutaneous daily   9/16/2022 at Unknown time     metFORMIN (GLUCOPHAGE XR) 500 MG 24 hr tablet Take 2,000 mg by mouth 2 times daily (with meals) (Patient crushes med to take.)   9/16/2022 at Unknown time     ONETOUCH ULTRA test strip Use as directed to test blood glucose up to twice daily.   Past Week at Unknown time     paliperidone (INVEGA SUSTENNA) 156 MG/ML ALIDA injection Inject 156 mg into the muscle every 28 days Last given 9/14/2022 9/14/2022 at Unknown time     B-D U/F 31G X 8 MM insulin pen needle Use as directed with lantus pen.                  Medication Dispense History (from 9/19/2021 to 9/19/2022)  Expand All  Collapse  All    Empagliflozin     Dispensed Days Supply Quantity Provider Pharmacy   JARDIANCE 25 MG TABLET 09/19/2022 30 30 Units Raymond Blackburn MD [id number]   JARDIANCE 25 MG TABLET 08/20/2022 30 30 Units Raymond Blackburn MD [facility]   JARDIANCE 25 MG TABLET 07/21/2022 30 30 Units Raymond Blackburn MD [facility]   JARDIANCE 25 MG TABLET 06/22/2022 30 30 Units Raymond Blackburn MD [facility]   JARDIANCE 25 MG TABLET 05/23/2022 30 30 Units Raymond Blackburn MD [facility]   JARDIANCE 25 MG TABLET 04/25/2022 30 30 Units Raymond Blackburn MD [facility]   JARDIANCE 25 MG TABLET 01/22/2022 30 30 Units Raymond Blackburn MD [facility]        Gemfibrozil     Dispensed Days Supply Quantity Provider Pharmacy   GEMFIBROZIL 600 MG TABLET 01/08/2022 90 180 Units Raymond Blackburn MD [facility]   GEMFIBROZIL 600 MG TABLET 10/05/2021 90 180 Units Raymond Blackburn MD [facility]        Glucose Blood     Dispensed Days Supply Quantity Provider Pharmacy   ONETOUCH ULTRA TEST STRIP 08/02/2022 90 200 Units Raymond Blackburn MD [facility]   ONETOUCH ULTRA TEST STRIP 05/07/2022 90 200 Units Raymond Blackburn MD [facility]        Insulin Glargine     Dispensed Days Supply Quantity Provider Pharmacy   LANTUS SOLOSTAR 100 UNIT/ML 07/21/2022 30 21 mL Raymond Blackburn MD [facility]   LANTUS SOLOSTAR 100 UNIT/ML 06/22/2022 30 21 mL Raymond Blackburn MD [facility]   LANTUS SOLOSTAR 100 UNIT/ML 05/20/2022 30 21 mL Raymond Blackburn MD [facility]   LANTUS SOLOSTAR 100 UNIT/ML 04/18/2022 28 21 mL Raymond Blackburn MD [facility]   LANTUS SOLOSTAR 100 UNIT/ML 03/14/2022 30 21 mL Raymond Blackburn MD [facility]   LANTUS SOLOSTAR 100 UNIT/ML 02/13/2022 30 21 mL Raymond Blackburn MD [facility]   LANTUS SOLOSTAR 100 UNIT/ML 01/14/2022 30 21 mL Raymond Blackburn MD [facility]   LANTUS SOLOSTAR 100 UNIT/ML 12/08/2021 30 21 mL Raymond Blackburn MD [facility]   LANTUS SOLOSTAR 100 UNIT/ML 11/05/2021 30  21 mL Raymond Blackburn MD [facility]        Insulin Pen Needle     Dispensed Days Supply Quantity Provider Pharmacy   BD UF SHORT PEN NEEDLE [id number] 09/12/2022 90 100 Units Raymond Blackburn MD [facility]   BD UF SHORT PEN NEEDLE [id number] 06/23/2022 90 100 Units Raymond Blackburn MD [facility]   UNIFINE PENTIPS 8MM 31G 03/18/2022 90 100 Units Raymond Blackburn MD [facility]   BD UF SHORT PEN NEEDLE [id number] 11/29/2021 90 100 Units Raymond Blackburn MD [facility]        Liraglutide     Dispensed Days Supply Quantity Provider Pharmacy   VICTOZA 3-SIMI 18 MG/3 ML PEN 09/15/2022 30 9 mL Raymond Blackburn MD [facility]   VICTOZA 3-SIMI 18 MG/3 ML PEN 08/17/2022 30 9 mL Raymond Blackburn MD [facility]   VICTOZA 3-SIMI 18 MG/3 ML PEN 08/04/2022 30 6 mL Raymond Blackburn MD [facility]   VICTOZA 3-SIMI 18 MG/3 ML PEN 07/03/2022 30 6 mL Raymond Blackburn MD [facility]   VICTOZA 2-SIMI 18 MG/3 ML PEN 06/04/2022 30 6 mL Raymond Blackburn MD [facility]   VICTOZA 2-SIMI 18 MG/3 ML PEN 05/06/2022 30 6 mL Raymond Blackburn MD [facility]        Nicotine     Dispensed Days Supply Quantity Provider Pharmacy   NICOTINE 21 MG/24HR PATCH 11/05/2021 28 28 Units Raymond Blackburn MD [facility]        Paliperidone Palmitate     Dispensed Days Supply Quantity Provider Pharmacy   Invega Sustenna 117 mg/0.75 mL intramuscular syringe 08/23/2022 28 0.75 mL Joyce Nick APRN CNP [place] [facility]   Invega Sustenna 117 mg/0.75 mL intramuscular syringe 07/15/2022 28 0.75 mL Joyce Nick APRN CNP [place] [facility] [place] [facility]   Invega Sustenna 117 mg/0.75 mL intramuscular syringe 06/20/2022 28 0.75 mL Joyce Nick APRN CNP [place] [facility]   Invega Sustenna 117 mg/0.75 mL intramuscular syringe 05/24/2022 28 0.75 mL Joyce Nick APRN CNP [place] [facility] [place] [facility]   Invega Sustenna 117 mg/0.75 mL intramuscular syringe 04/29/2022 28 0.75 mL Joyce Nick APRN CNP [place] [facility]    Invega Sustenna 117 mg/0.75 mL intramuscular syringe 03/30/2022 28 0.75 mL Joyce Nick A, MAYNOR VINCENT [place] [facility]   Invega Sustenna 117 mg/0.75 mL intramuscular syringe 02/25/2022 28 0.75 mL NickGeorgeet A, APRN CNP [place] [facility]   Invega Sustenna 117 mg/0.75 mL intramuscular syringe 02/04/2022 28 0.75 mL NickGeorgeet A, APRN CNP [place] [facility]   Invega Sustenna 117 mg/0.75 mL intramuscular syringe 01/04/2022 30 0.75 mL NickGeorgeet A, MAYNOR MELISA [place] [facility]   Invega Sustenna 117 mg/0.75 mL intramuscular syringe 12/06/2021 30 0.75 mL NickGeorgeet A, APRRONAN MELISA [place] [facility]   Invega Sustenna 117 mg/0.75 mL intramuscular syringe 11/09/2021 30 0.75 mL Joyce Nick A, APRRONAN MELISA [place] [facility]   Invega Sustenna 117 mg/0.75 mL intramuscular syringe 10/11/2021 30 0.75 mL Joyce Nick, MAYNOR MELISA [facility] [facility]        metFORMIN HCl     Dispensed Days Supply Quantity Provider Pharmacy   METFORMIN HCL  MG TABLET 08/02/2022 90 360 Units Raymond Blackburn MD [facility]   METFORMIN HCL  MG TABLET 05/05/2022 90 360 Units Raymond Blackburn MD [facility]   METFORMIN HCL  MG TABLET 02/04/2022 90 360 Units Raymond Blackburn MD [facility]        Disclaimer    Certain dispenses may not be available or accurate in this report, including over-the-counter medications, low cost prescriptions, prescriptions paid for by the patient or non-participating sources, or errors in insurance claims information. The provider should independently verify medication history with the patient.    External Sources

## 2022-09-19 NOTE — PLAN OF CARE
"Social Service Psychosocial Assessment    Presenting Problem: Per H&P \"Pt presented to the emergency department accompanied by parents with complaint of crisis related to his schizoaffective disorder bipolar type. Parents report the patient has had increased problems over the last several months with worsening paranoia and delusional thinking. Patient has had suicidal ideations and made comments about shooting himself.\"    Marital Status: Single    Spouse / Children: None    Psychiatric TX HX: Pt was last on our unit 03/10/17.    Suicide Risk Assessment: On admission pt reported having SI, pt reported to staff that he plans to shoot himself when he discharges. Pt denies any prior suicide attempts. Pt denies SI today.     Access to Lethal Means (explain): Pt denies access to guns.     Family Psych HX: Pt reported that his sister has anxiety disorder and did not leave her apartment for 4 or 5 months.     A & Ox: x4    Medication Adherence: See H&P    Medical Issues: See H&P    Visual -Motor Functioning: Good    Communication Skills /Needs: Pt presents with pressured, disorganized, tangential speech.    Ethnicity: White                   Spirituality/Confucianist Affiliation: Restoration               Clergy Request: Yes     History: None reported    Living Situation: Pt is living with his sister in [place].     ADL s: Independent    Education: Graduated high school. Completed  program at a community college.    Financial Situation: Is employed.    Occupation: Employed full time at [employer] working nights.    Leisure & Recreation: Hunting and fishing.    Childhood History: Pt grew up in [place]. Has 1 older sister.    Trauma Abuse HX: Pt denies any childhood abuse.    Relationship / Sexuality: Pt denies any current relationship.    Substance Use/ Abuse: No utox available. Pt has a history of alcohol abuse. Pt denies any recent substance abuse.    Chemical Dependency Treatment HX: Pt completed CD treatment in " 2006 and 2009 following a DUI.    Legal Issues: History of a DUI conviction. Denies any current legal issues.    Significant Life Events: Unknown     Strengths: Good family support system, willingness to accept help.    Challenges /Limitation: Mental health symptoms: paranoia and delusions.     Patient Support Contact (Include name, relationship, number, and summary of conversation):     Interventions:         Medical/Dental Care: PCP: Raymond hollingsworth @ [facility]      Medication Management: Joyce Nick      Therapy: Would benefit      Insurance Coverage: PREFERREDONE      Commit/Llamas Screening: Yes      Suicide Risk Assessment: On admission pt reported having SI, pt reported to staff that he plans to shoot himself when he discharges. Pt denies any prior suicide attempts. Pt denies SI today.       High Risk Safety Plan: Talk to supports; Call crisis lines; Go to local ER if feeling suicidal.

## 2022-09-19 NOTE — PLAN OF CARE
"  Problem: Behavioral Health Plan of Care  Goal: Patient-Specific Goal (Individualization)  Description: Pt will sleep at least 6-8 hours   Pt will eat at least 50% of meals   Pt will be medication compliant   Pt will follow treatment team recommendations   Pt will attend at least 50% of groups   Patient prefers to have pills crushed and put in water.     Outcome: Ongoing, Progressing  Note: 15:40: Received end of shift report from JULIO CESAR Rios. Pt requesting nicotine gum upon arrival, blunted/flat affect, mood is anxious/depressed. Less withdrawn. Denies SI/HI, hallucinations, endorses anxiety, questioning if any dissolvable medications were available, hydroxyzine offered, pt declined, states, \"No, never mind, I'll be fine\" Pt anticipates discharge this upcoming Wed/Thursday.    19:45: Accucheck of 145----      22:00: Pt less isolative this shift, withdrawn activity continues, does offer increased communication, is argumentative re: insulin lancet \"It doesn't work\" \"It doesn't do jamal\" This writer educated, assured pt that insulin pen/lancet is functioning properly-- et will allow pt to demonstrate proper self administration, exchange teachings--     20:45: Co-RN set-up scheduled Lantus et self administers appropriately. Compliant with  scheduled Risperdal.     Face to face end of shift report to be communicated to on-coming [facility] staff.     Betsey Sanders RN  9/19/2022  10:16 PM        Problem: Suicide Risk  Goal: Absence of Self-Harm  Description: Pt will be safe from self injury while on the unit   Pt will seek out staff if feeling an increase in suicidal ideations   Pt will report decrease in SI by the time of discharge       Outcome: Ongoing, Progressing     Problem: Suicidal Behavior  Goal: Suicidal Behavior is Absent or Managed  Outcome: Ongoing, Progressing   Goal Outcome Evaluation:                      "

## 2022-09-19 NOTE — PLAN OF CARE
Problem: Behavioral Health Plan of Care  Goal: Patient-Specific Goal (Individualization)  Description: Pt will sleep at least 6-8 hours   Pt will eat at least 50% of meals   Pt will be medication compliant   Pt will follow treatment team recommendations   Pt will attend at least 50% of groups       Outcome: Ongoing, Progressing  Note: Report received from Betsey. Rounding complete. Pt observed sleeping in left side lying position with regular and unlabored respirations.    Pt has been in bed with eyes closed and regular respirations. 15 minute and PRN checks all night. No complaints offered. Will continue to monitor.    AM BS 81    Pt slept approx  7.5  hours this NOC shift.    Face to face end of shift report communicated to oncoming RN.    Jen BLACK RN  September 19, 2022  4:34 AM          Problem: Suicide Risk  Goal: Absence of Self-Harm  Description: Pt will be safe from self injury while on the unit   Pt will seek out staff if feeling an increase in suicidal ideations   Pt will report decrease in SI by the time of discharge       Outcome: Ongoing, Progressing  Note: Unable to assess due to pt sleeping. Pt has remained free of self-harm.     Goal Outcome Evaluation:

## 2022-09-19 NOTE — DISCHARGE INSTRUCTIONS
"Behavioral Discharge Planning and Instructions    Summary: Pt presented to the emergency department accompanied by parents with complaint of crisis related to his schizoaffective disorder bipolar type. Parents report the patient has had increased problems over the last several months with worsening paranoia and delusional thinking. Patient has had suicidal ideations and made comments about shooting himself.    Main Diagnosis: Schizoaffective Disorder, bipolar type    Health Care Follow-up:     [facility]  Therapy intake- Wednesday September 28th @ 1:00 PM.  Address: [address] [place], [place] [address]  Phone: [phone]    [facility]   [employer] - Joyce Nick:  [address] [place] [address]  Phone: [phone]  Fax: [phone]     Crisis Intervention: 342.341.8435 or 369-910-5637 (TTY: [phone]).  Call anytime for help.  National Opp on Mental Illness (www.[place].andressa.org): 905.386.5405 or 357-242-5081.  Alcoholics Anonymous (www.alcoholics-anonymous.org): Check your phone book for your local chapter.  Suicide Awareness Voices of Education (SAVE) (www.save.org): 892-732-VKRO (1568)  National Suicide Prevention Line (www.mentalhealthmn.org): 892-694-VAIO (7549)  Mental Health Consumer/Survivor Network of [place] (www.mhcsn.net): 252.391.9886 or 874-639-3555  Mental Health Association of [place] (www.mentalhealth.org): 726.248.4382 or 996-411-8107  Self- Management and Recovery Training., SMART-- Toll free: 781.581.1557  www.[employer].org  Text 4 Life: txt \"LIFE\" to 86253 for immediate support and crisis intervention  Crisis text line: Text \"[place]\" to 056917. Free, confidential, 24/7.  Crisis Intervention: 498.590.4178 or 079-516-8982. Call anytime for help.     Attend all scheduled appointments with your outpatient providers. Call at least 24 hours in advance if you need to reschedule an appointment to ensure continued access to your outpatient providers. " "    Major Treatments, Procedures and Findings:  You were provided with: a psychiatric assessment, assessed for medical stability, medication evaluation and/or management, group therapy, family therapy, individual therapy, CD evaluation/assessment, milieu management, and medical interventions.    Symptoms to Report: feeling more aggressive, increased confusion, losing more sleep, mood getting worse, or thoughts of suicide    Early warning signs can include: increased depression or anxiety sleep disturbances increased thoughts or behaviors of suicide or self-harm  increased unusual thinking, such as paranoia or hearing voices    Safety and Wellness:  Take all medicines as directed.  Make no changes unless your doctor suggests them.      Follow treatment recommendations.  Refrain from alcohol and non-prescribed drugs.  Ask your support system to help you reduce your access to items that could harm yourself or others. Items could include:  Firearms  Medicines (both prescribed and over-the-counter)  Knives and other sharp objects  Ropes and like materials  Car keys  If there is a concern for safety, call 911. If there is a concern for safety, call 911.    Resources:   Crisis Intervention: 218.627.2651 or 451-276-3540 (TTY: 581.306.2862).  Call anytime for help.  National [place] on Mental Illness (www.[place].andressa.org): 912.192.7798 or 613-337-2493.  Alcoholics Anonymous (www.alcoholics-anonymous.org): Check your phone book for your local chapter.  Suicide Awareness Voices of Education (SAVE) (www.save.org): 098-150-RNCI (8183)  National Suicide Prevention Line (www.mentalhealthmn.org): 766-443-JZQO (3591)  Mental Health Consumer/Survivor Network of [place] (www.mhcsn.net): 633.100.2929 or 233-601-3278  Mental Health Association of [place] (www.mentalhealth.org): 297.711.4317 or 593-754-0714  Self- Management and Recovery Training., SMART-- Toll free: 255.941.2642  www.[employer]  Text 4 Life: txt \"LIFE\" to 22124 for immediate " "support and crisis intervention  Crisis text line: Text \"[place]\" to 432011. Free, confidential, 24/7.  Crisis Intervention: 325.289.9083 or 084-142-7714. Call anytime for help.     General Medication Instructions:   See your medication sheet(s) for instructions.   Take all medicines as directed.  Make no changes unless your doctor suggests them.   Go to all your doctor visits.  Be sure to have all your required lab tests. This way, your medicines can be refilled on time.  Do not use any drugs not prescribed by your doctor.  Avoid alcohol.    Advance Directives:   Scanned document on file with [employer]? No scanned doc  Is document scanned? Pt states no documents  Honoring Choices Your Rights Handout: Informed and given  Was more information offered? Materials given    The Treatment team has appreciated the opportunity to work with you. If you have any questions or concerns about your recent admission, you can contact the unit which can receive your call 24 hours a day, 7 days a week. They will be able to get in touch with a Provider if needed. The unit number is [phone] .  "

## 2022-09-19 NOTE — PROGRESS NOTES
"[facility] PSYCHIATRY  PROGRESS NOTE     SUBJECTIVE     Prior to interviewing the patient, I met with nursing and reviewed patient's clinical condition. We discussed clinical care both before and after the interview. I have reviewed the patient's clinical course by review of records including previous notes, labs, and vital signs.     Per nursing, the patient had the following behavioral events over the last 24-hours: none.    On psychiatric interview, the patient was found in his bed. He notes that his AH are starting to improve some. He does note some continued paranoia.     He notes that he is tolerating the addition of Risperdal well. He denies any problems with this medication. He denies any restlessness, dystonia, or parkinsonism. The patient would like to continue taking it.     The patient denies any headache, confusion, change in vision, chest pain, SOB, abdominal pain, diarrhea, or constipation. No medical concerns today.    The patient is not interested in any changes today and denies any acute concerns.        MEDICATIONS   Scheduled Meds:    aspirin  81 mg Oral Daily     empagliflozin  25 mg Oral Daily     gemfibrozil  600 mg Oral Daily     insulin glargine  70 Units Subcutaneous At Bedtime     liraglutide  1.8 mg Subcutaneous Daily     metFORMIN  500 mg Oral Daily     nicotine  1 patch Transdermal Daily     nicotine   Transdermal Q8H     [START ON 10/12/2022] paliperidone  156 mg Intramuscular Q30 Days     risperiDONE  1 mg Sublingual BID     PRN Meds:.acetaminophen, alum & mag hydroxide-simethicone, glucose **OR** dextrose **OR** glucagon, hydrOXYzine, melatonin, nicotine, OLANZapine **OR** OLANZapine, senna-docusate     ALLERGIES   No Known Allergies     MENTAL STATUS EXAM   Vitals: /81   Pulse 111   Temp 98  F (36.7  C) (Tympanic)   Resp 18   Ht 1.854 m (6' 1\")   Wt 119.5 kg (263 lb 6.4 oz)   SpO2 98%   BMI 34.75 kg/m      Appearance:  awake, alert  Attitude:  cooperative  Eye " "Contact:  good  Mood:  \"alright\"  Affect:  intensity is flat  Speech:  clear, coherent  Psychomotor Behavior:  no evidence of tardive dyskinesia, dystonia, or tics  Thought Process:  More linear   Associations:  loosening of associations present  Thought Content:  Denies SI and HI. AH present. Paranoia present. Small improvement.  Insight:  fair  Judgment:  fair  Oriented to:  time, person, and place  Attention Span and Concentration:  fair  Recent and Remote Memory:  fair  Language: Able to name objects, Able to repeat phrases and Able to read and write  Fund of Knowledge: low-normal  Muscle Strength and Tone: normal  Gait and Station: Normal       LABS   Recent Results (from the past 24 hour(s))   Glucose by meter    Collection Time: 09/18/22  7:46 PM   Result Value Ref Range    GLUCOSE BY METER POCT 75 70 - 99 mg/dL   Glucose by meter    Collection Time: 09/19/22  6:30 AM   Result Value Ref Range    GLUCOSE BY METER POCT 81 70 - 99 mg/dL         IMPRESSION     Patient is a 47-year-old male presenting to the emergency department accompanied by parents with complaint of crisis related to his schizoaffective disorder bipolar type. Parents report the patient has had increased problems over the last several months with worsening paranoia and delusional thinking. Patient has had suicidal ideations and made comments about shooting himself. Patient lives with his sisters and there are no firearms within the home. Patient just had an increase in his Invega injection which he last received on Wednesday, September 14th, up to 156 mg in an effort to help \"quiet his mind\", but it has not been effective. Patient's delusional and paranoid thinking has been persistent at work with him describing a woman who followed him from his previous workplace of 2 years ago in [place] and is causing crisis to the point that patient is having difficulty functioning at home as well. Parents report that the last time patient had symptoms this " severe was when he was last hospitalized. Patient denies any alcohol or illicit drug use. Patient denies any fever or chills. No cough or breathing difficulty. Past medical history is significant for obesity, type 2 diabetes mellitus, and tobacco use disorder. No other complaints or concerns at this time.    Today: The patient is noting some small improvement in his psychosis after starting oral Risperdal supplementation. The patient's Invega Sustenna is also increasing in blood concentration given further time being metabolized. Recommend continued use of Risperdal, likely for the next couple of months while his Invega reaches more of a steady state (can take 3-5 months). Will probably switch over to oral Invega in next few days if the patient is able to take the pill version, as no m-tab version of Invega.        DIAGNOSES     1.Schizoaffective Disorder, bipolar type       PLAN     Location: Unit 5  Legal Status: Orders Placed This Encounter      Voluntary    Safety Assessment:    Behavioral Orders   Procedures     Code 1 - Restrict to Unit     Routine Programming     As clinically indicated     Status 15     Every 15 minutes.      PTA medications continued/changed:      - Invega Sustenna 156 mg every 30 days, last dose 9/14 (first dose at 156 mg after increased)     New medications initiated:      - Risperdal m-tab 1 mg BID    Today's Changes:    - None    Programming: Patient will be treated in a therapeutic milieu with appropriate individual and group therapies. Education will be provided on diagnoses, medications, and treatments.     Medical diagnoses:      #. DM  - Jardiance 25 mg daily  - Lopid 600 mg daily  - Metformin  mg daily  - Lantus 70 U subQ at bedtime   - Victoza 1.8 mg subQ daily   - Aspirin daily   - Hypoglycemia protocol     Consult: None  Tests: None    Anticipated LOS: Discharge by Friday likely  Disposition: Home with outpatient services       ATTESTATION      Marquise Vaughan DO,  MA  Psychiatrist

## 2022-09-19 NOTE — PLAN OF CARE
"  Problem: Behavioral Health Plan of Care  Goal: Patient-Specific Goal (Individualization)  Description: Pt will sleep at least 6-8 hours   Pt will eat at least 50% of meals   Pt will be medication compliant   Pt will follow treatment team recommendations   Pt will attend at least 50% of groups   Outcome: Ongoing, Progressing     Pt in bed at the start of the shift. Pt upset when staff brought him his medications. Pt stated \"you know I need those all crushed\". Nursing crushed all pills,brought pudding and juice to pt, he agreed to have meds mixed in pudding. Pt took a small bight on a spoon and gave it back. Pt stated \"this wasn't a good idea\". Pt refused to eat the rest of his morning meds. Pt told nursing he knows how to do his insulin and grabbed the [place] away from staff. Pt pulled up his sweatshirt and put insulin pin up to his abdomen. Pt then complained that the insulin needle didn't even come out. Pt was told that he needed to hold pen tight to his skin but would not. Nursing took back the insulin pen but pt stated \"just forget it\" and would not let nursing give the insulin.           Problem: Suicide Risk  Goal: Absence of Self-Harm  Description: Pt will be safe from self injury while on the unit   Pt will seek out staff if feeling an increase in suicidal ideations   Pt will report decrease in SI by the time of discharge       Outcome: Ongoing, Progressing     Problem: Suicidal Behavior  Goal: Suicidal Behavior is Absent or Managed  Outcome: Ongoing, Progressing   Goal Outcome Evaluation:           Face to face end of shift report communicated to evening shift RN.   Reported that pt is a risk for suicide.   Tresa Zamorano RN  9/19/2022  7:53 AM                "

## 2022-09-20 LAB
GLUCOSE BLDC GLUCOMTR-MCNC: 152 MG/DL (ref 70–99)
GLUCOSE BLDC GLUCOMTR-MCNC: 76 MG/DL (ref 70–99)

## 2022-09-20 PROCEDURE — 250N000013 HC RX MED GY IP 250 OP 250 PS 637: Performed by: NURSE PRACTITIONER

## 2022-09-20 PROCEDURE — 124N000001 HC R&B MH

## 2022-09-20 PROCEDURE — 99233 SBSQ HOSP IP/OBS HIGH 50: CPT | Performed by: STUDENT IN AN ORGANIZED HEALTH CARE EDUCATION/TRAINING PROGRAM

## 2022-09-20 RX ORDER — ASPIRIN 81 MG/1
81 TABLET, CHEWABLE ORAL DAILY
Status: DISCONTINUED | OUTPATIENT
Start: 2022-09-21 | End: 2022-09-21 | Stop reason: HOSPADM

## 2022-09-20 RX ADMIN — INSULIN GLARGINE 70 UNITS: 100 INJECTION, SOLUTION SUBCUTANEOUS at 19:56

## 2022-09-20 RX ADMIN — RISPERIDONE 1 MG: 1 TABLET, ORALLY DISINTEGRATING ORAL at 10:21

## 2022-09-20 RX ADMIN — EMPAGLIFLOZIN 25 MG: 25 TABLET, FILM COATED ORAL at 10:18

## 2022-09-20 RX ADMIN — NICOTINE POLACRILEX 2 MG: 2 GUM, CHEWING ORAL at 17:49

## 2022-09-20 RX ADMIN — NICOTINE POLACRILEX 2 MG: 2 GUM, CHEWING ORAL at 20:13

## 2022-09-20 RX ADMIN — RISPERIDONE 1 MG: 1 TABLET, ORALLY DISINTEGRATING ORAL at 21:28

## 2022-09-20 RX ADMIN — NICOTINE 1 PATCH: 21 PATCH, EXTENDED RELEASE TRANSDERMAL at 10:30

## 2022-09-20 RX ADMIN — ASPIRIN 81 MG: 81 TABLET, COATED ORAL at 10:18

## 2022-09-20 RX ADMIN — GEMFIBROZIL 600 MG: 600 TABLET ORAL at 10:18

## 2022-09-20 RX ADMIN — NICOTINE POLACRILEX 2 MG: 2 GUM, CHEWING ORAL at 08:25

## 2022-09-20 RX ADMIN — NICOTINE POLACRILEX 2 MG: 2 GUM, CHEWING ORAL at 23:32

## 2022-09-20 RX ADMIN — NICOTINE POLACRILEX 2 MG: 2 GUM, CHEWING ORAL at 14:33

## 2022-09-20 ASSESSMENT — ACTIVITIES OF DAILY LIVING (ADL)
ADLS_ACUITY_SCORE: 39
ADLS_ACUITY_SCORE: 39
LAUNDRY: UNABLE TO COMPLETE
ADLS_ACUITY_SCORE: 39
HYGIENE/GROOMING: INDEPENDENT
ADLS_ACUITY_SCORE: 39
DRESS: SCRUBS (BEHAVIORAL HEALTH);INDEPENDENT
ADLS_ACUITY_SCORE: 39
ADLS_ACUITY_SCORE: 39
ORAL_HYGIENE: INDEPENDENT
ADLS_ACUITY_SCORE: 39

## 2022-09-20 NOTE — PROGRESS NOTES
[facility] PSYCHIATRY  PROGRESS NOTE     SUBJECTIVE     Prior to interviewing the patient, I met with nursing and reviewed patient's clinical condition. We discussed clinical care both before and after the interview. I have reviewed the patient's clinical course by review of records including previous notes, labs, and vital signs.     Per nursing, the patient had the following behavioral events over the last 24-hours: none. Patient has had a hard time with his insulin management.    On psychiatric interview, the patient was found in his bed. He notes that his AH have improved. He notes that his paranoia has improved also. He feels like he is ready to discharge tomorrow. He denies any problems with his medications.    He does not want to take any medications unless they can be crushed. He would prefer to take Risperdal M-tabs. He notes that he gags when he tries to swallow pills.    The patient denies any headache, confusion, change in vision, chest pain, SOB, abdominal pain, diarrhea, or constipation. No medical concerns today. He notes that he would like to continue all his other medications. He consents to switching Metformin form to not being ER. Discussed starting at a lower dose to see how he responds now that he will be taking appropriately.       MEDICATIONS   Scheduled Meds:    aspirin  81 mg Oral Daily     empagliflozin  25 mg Oral Daily     gemfibrozil  600 mg Oral Daily     insulin glargine  70 Units Subcutaneous At Bedtime     liraglutide  1.8 mg Subcutaneous Daily     metFORMIN  500 mg Oral Daily     nicotine  1 patch Transdermal Daily     nicotine   Transdermal Q8H     [START ON 10/12/2022] paliperidone  156 mg Intramuscular Q30 Days     risperiDONE  1 mg Sublingual BID     PRN Meds:.acetaminophen, alum & mag hydroxide-simethicone, glucose **OR** dextrose **OR** glucagon, hydrOXYzine, melatonin, nicotine, OLANZapine **OR** OLANZapine, senna-docusate     ALLERGIES   No Known Allergies     MENTAL  "STATUS EXAM   Vitals: /73   Pulse 74   Temp 97.3  F (36.3  C) (Temporal)   Resp 14   Ht 1.854 m (6' 1\")   Wt 119.5 kg (263 lb 6.4 oz)   SpO2 98%   BMI 34.75 kg/m      Appearance:  awake, alert  Attitude:  cooperative  Eye Contact:  good  Mood:  \"alright\"  Affect:  intensity is flat  Speech:  clear, coherent  Psychomotor Behavior:  no evidence of tardive dyskinesia, dystonia, or tics  Thought Process:  More linear   Associations:  loosening of associations present  Thought Content:  Denies SI and HI. AH present. Paranoia present. Improving  Insight:  fair  Judgment:  fair  Oriented to:  time, person, and place  Attention Span and Concentration:  fair  Recent and Remote Memory:  fair  Language: Able to name objects, Able to repeat phrases and Able to read and write  Fund of Knowledge: low-normal  Muscle Strength and Tone: normal  Gait and Station: Normal       LABS   Recent Results (from the past 24 hour(s))   Glucose by meter    Collection Time: 09/19/22  7:58 PM   Result Value Ref Range    GLUCOSE BY METER POCT 145 (H) 70 - 99 mg/dL   Glucose by meter    Collection Time: 09/20/22  6:36 AM   Result Value Ref Range    GLUCOSE BY METER POCT 76 70 - 99 mg/dL         IMPRESSION     Patient is a 47-year-old male presenting to the emergency department accompanied by parents with complaint of crisis related to his schizoaffective disorder bipolar type. Parents report the patient has had increased problems over the last several months with worsening paranoia and delusional thinking. Patient has had suicidal ideations and made comments about shooting himself. Patient lives with his sisters and there are no firearms within the home. Patient just had an increase in his Invega injection which he last received on Wednesday, September 14th, up to 156 mg in an effort to help \"quiet his mind\", but it has not been effective. Patient's delusional and paranoid thinking has been persistent at work with him describing a " woman who followed him from his previous workplace of 2 years ago in [place] and is causing crisis to the point that patient is having difficulty functioning at home as well. Parents report that the last time patient had symptoms this severe was when he was last hospitalized. Patient denies any alcohol or illicit drug use. Patient denies any fever or chills. No cough or breathing difficulty. Past medical history is significant for obesity, type 2 diabetes mellitus, and tobacco use disorder. No other complaints or concerns at this time.    Today: The patient is noting some small improvement in his psychosis after starting oral Risperdal supplementation. The patient's Invega Sustenna is also increasing in blood concentration given further time being metabolized. Recommend continued use of Risperdal, likely for the next couple of months while his Invega reaches more of a steady state (can take 3-5 months). Will plan for discharge tomorrow, given improvement in the patient's psychosis to the point that he is requesting discharge.       DIAGNOSES     1.Schizoaffective Disorder, bipolar type, multiple episodes, in acute episode       PLAN     Location: Unit 5  Legal Status: Orders Placed This Encounter      Voluntary    Safety Assessment:    Behavioral Orders   Procedures     Code 1 - Restrict to Unit     Routine Programming     As clinically indicated     Status 15     Every 15 minutes.      PTA medications continued/changed:      - Invega Sustenna 156 mg every 30 days, last dose 9/14 (first dose at 156 mg after increased)     New medications initiated:      - Risperdal m-tab 1 mg BID    Today's Changes:    - See below     Programming: Patient will be treated in a therapeutic milieu with appropriate individual and group therapies. Education will be provided on diagnoses, medications, and treatments.     Medical diagnoses:      #. DM  - Jardiance 25 mg daily  - Lopid 600 mg daily  - Metformin  mg daily being change  to Metformin BID to be crushed  - Lantus 70 U subQ at bedtime   - Victoza 1.8 mg subQ daily   - Aspirin daily changed to chewable form  - Hypoglycemia protocol     Consult: Diabetic educator  Tests: None    Anticipated LOS: Discharge tomorrow with family to   Disposition: Home with outpatient services       ATTESTATION      Marquise Vaughan DO, MA  Psychiatrist

## 2022-09-20 NOTE — PLAN OF CARE
Problem: Behavioral Health Plan of Care  Goal: Patient-Specific Goal (Individualization)  Description: Pt will sleep at least 6-8 hours   Pt will eat at least 50% of meals   Pt will be medication compliant   Pt will follow treatment team recommendations   Pt will attend at least 50% of groups   Patient prefers to have pills crushed and put in water.     Outcome: Ongoing, Progressing     Problem: Suicide Risk  Goal: Absence of Self-Harm  Description: Pt will be safe from self injury while on the unit   Pt will seek out staff if feeling an increase in suicidal ideations   Pt will report decrease in SI by the time of discharge       Outcome: Ongoing, Progressing     Face to face shift report received from Betsey HARRELL. Rounding completed, pt observed.     Pt appeared to sleep most of the NOC. Pt did not have any noted episodes of self harm this shift.    Face to face report will be communicated to oncoming RN.    Gregoria Mcfadden RN  9/20/2022  5:48 AM

## 2022-09-20 NOTE — PLAN OF CARE
"  Problem: Behavioral Health Plan of Care  Goal: Patient-Specific Goal (Individualization)  Description: Pt will sleep at least 6-8 hours   Pt will eat at least 50% of meals   Pt will be medication compliant   Pt will follow treatment team recommendations   Pt will attend at least 50% of groups   Patient prefers to have pills crushed and put in water.     Outcome: Ongoing, Progressing     Pt in bed at the start of the shift. Pt denies all symptoms of pain, anxiety, depression, SI, HI and hallucinations. Pt refused his Victoza, pt states that his blood sugar is too low thinking that the victoza will make his blood sugar \"crash\".     Pt's medications were crushed, pt not given the Metformin since it is an XR. Pt reported that he had been crushing it at home.     Pt's mom called to get an update this morning. Pt's mom states her son is calling her telling her that he is being discharged tomorrow but she is worried that he is still very delusional and is not really being discharged. She reported that he visited with his cousin and began telling her about the woman that followed him from [place] and is afraid he won't be able to go back to work. Pt's mom also stated that pt is telling her that he doesn't trust the nurses here and that he believes they are going to hurt him by making him take the Victoza.           Problem: Suicide Risk  Goal: Absence of Self-Harm  Description: Pt will be safe from self injury while on the unit   Pt will seek out staff if feeling an increase in suicidal ideations   Pt will report decrease in SI by the time of discharge       Outcome: Ongoing, Progressing     Problem: Suicidal Behavior  Goal: Suicidal Behavior is Absent or Managed  Outcome: Ongoing, Progressing   Goal Outcome Evaluation:    Plan of Care Reviewed With: patient                 "

## 2022-09-21 VITALS
BODY MASS INDEX: 34.91 KG/M2 | TEMPERATURE: 97.5 F | SYSTOLIC BLOOD PRESSURE: 127 MMHG | OXYGEN SATURATION: 98 % | RESPIRATION RATE: 16 BRPM | WEIGHT: 263.4 LBS | DIASTOLIC BLOOD PRESSURE: 77 MMHG | HEART RATE: 90 BPM | HEIGHT: 73 IN

## 2022-09-21 LAB — GLUCOSE BLDC GLUCOMTR-MCNC: 101 MG/DL (ref 70–99)

## 2022-09-21 PROCEDURE — 250N000013 HC RX MED GY IP 250 OP 250 PS 637: Performed by: STUDENT IN AN ORGANIZED HEALTH CARE EDUCATION/TRAINING PROGRAM

## 2022-09-21 PROCEDURE — 250N000013 HC RX MED GY IP 250 OP 250 PS 637: Performed by: NURSE PRACTITIONER

## 2022-09-21 PROCEDURE — 99239 HOSP IP/OBS DSCHRG MGMT >30: CPT | Performed by: STUDENT IN AN ORGANIZED HEALTH CARE EDUCATION/TRAINING PROGRAM

## 2022-09-21 RX ORDER — RISPERIDONE 1 MG/1
1 TABLET, ORALLY DISINTEGRATING ORAL 2 TIMES DAILY
Qty: 60 TABLET | Refills: 1 | Status: ON HOLD | OUTPATIENT
Start: 2022-09-21 | End: 2022-10-10

## 2022-09-21 RX ORDER — GEMFIBROZIL 600 MG/1
600 TABLET, FILM COATED ORAL DAILY
DISCHARGE
Start: 2022-09-21

## 2022-09-21 RX ORDER — ASPIRIN 81 MG/1
81 TABLET, CHEWABLE ORAL DAILY
Qty: 30 TABLET | Refills: 1 | Status: SHIPPED | OUTPATIENT
Start: 2022-09-22

## 2022-09-21 RX ADMIN — ASPIRIN 81 MG CHEWABLE TABLET 81 MG: 81 TABLET CHEWABLE at 08:24

## 2022-09-21 RX ADMIN — NICOTINE POLACRILEX 2 MG: 2 GUM, CHEWING ORAL at 09:22

## 2022-09-21 RX ADMIN — RISPERIDONE 1 MG: 1 TABLET, ORALLY DISINTEGRATING ORAL at 08:24

## 2022-09-21 RX ADMIN — GEMFIBROZIL 600 MG: 600 TABLET ORAL at 08:24

## 2022-09-21 RX ADMIN — EMPAGLIFLOZIN 25 MG: 25 TABLET, FILM COATED ORAL at 08:24

## 2022-09-21 RX ADMIN — NICOTINE 1 PATCH: 21 PATCH, EXTENDED RELEASE TRANSDERMAL at 08:39

## 2022-09-21 RX ADMIN — NICOTINE POLACRILEX 2 MG: 2 GUM, CHEWING ORAL at 12:26

## 2022-09-21 ASSESSMENT — ACTIVITIES OF DAILY LIVING (ADL)
ADLS_ACUITY_SCORE: 39
ORAL_HYGIENE: INDEPENDENT
ADLS_ACUITY_SCORE: 39
HYGIENE/GROOMING: INDEPENDENT
DRESS: SCRUBS (BEHAVIORAL HEALTH);INDEPENDENT
LAUNDRY: UNABLE TO COMPLETE
ADLS_ACUITY_SCORE: 39
ADLS_ACUITY_SCORE: 39

## 2022-09-21 NOTE — PLAN OF CARE
Problem: Behavioral Health Plan of Care  Goal: Patient-Specific Goal (Individualization)  Description: Pt will sleep at least 6-8 hours   Pt will eat at least 50% of meals   Pt will be medication compliant   Pt will follow treatment team recommendations   Pt will attend at least 50% of groups   Patient prefers to have pills crushed and put in water.     Outcome: Ongoing, Progressing     Problem: Suicide Risk  Goal: Absence of Self-Harm  Description: Pt will be safe from self injury while on the unit   Pt will seek out staff if feeling an increase in suicidal ideations   Pt will report decrease in SI by the time of discharge       Outcome: Ongoing, Progressing     Face to face shift report received from Betsey HARRELL. Rounding completed, pt observed.     Pt appeared to sleep most of this shift. Pt did not have any noted episodes of self harm this shift.    Face to face report will be communicated to oncoming RN.    Gregoria Mcfadden RN  9/21/2022  6:08 AM

## 2022-09-21 NOTE — DISCHARGE SUMMARY
"[facility] PSYCHIATRY  DISCHARGE SUMMARY     DISCHARGE DATA     Bandar Lawson MRN# [id number]   Age: 47 year old YOB: 1975     Date of Admission: 9/16/2022  Date of Discharge: 9/21/2022  Discharge Provider: Marquise Vaughan DO       REASON FOR ADMISSION     Patient is a 47-year-old male presenting to the emergency department accompanied by parents with complaint of crisis related to his schizoaffective disorder bipolar type. Parents report the patient has had increased problems over the last several months with worsening paranoia and delusional thinking. Patient has had suicidal ideations and made comments about shooting himself. Patient lives with his sisters and there are no firearms within the home. Patient just had an increase in his Invega injection which he last received on Wednesday, September 14th, up to 156 mg in an effort to help \"quiet his mind\", but it has not been effective. Patient's delusional and paranoid thinking has been persistent at work with him describing a woman who followed him from his previous workplace of 2 years ago in [place] and is causing crisis to the point that patient is having difficulty functioning at home as well. Parents report that the last time patient had symptoms this severe was when he was last hospitalized. Patient denies any alcohol or illicit drug use. Patient denies any fever or chills. No cough or breathing difficulty. Past medical history is significant for obesity, type 2 diabetes mellitus, and tobacco use disorder. No other complaints or concerns at this time.       DISCHARGE DIAGNOSES     Schizoaffective disorder, bipolar type, multiple episodes, in acute episode       CONSULTS     Diabetic educator        HOSPITAL COURSE   Psychiatric Course:    Legal status: Orders Placed This Encounter      Voluntary    Patient was admitted to unit 5 due to the aforementioned presentation. The patient was placed under 15 minute checks to ensure patient safety. " The patient participated in unit programming and groups as able.    Mr. Lawson did not require seclusion/restraint during hospitalization.     We reviewed with Mr. Lawson current and past medication trials including duration, dose, response and side effects. During this hospitalization, the following changes to the patient's psychotropic medications were made:    PTA medications continued/changed:      - Invega Sustenna 156 mg every 30 days, last dose 9/14 (first dose at 156 mg after increased)     New medications initiated:      - Risperdal m-tab 1 mg BID    The patient noted improvement in his psychosis, namely his auditory hallucinations and delusions with this medication change. Discussed how he should only need oral supplementation for the next 1-4 months while his symptoms stabilize and his CUTLER gets to a therapeutic blood level given the recent change. His outpatient provider will manage and come off of the M-tabs when he is ready. Oral Invega was not used to the patient preferring to crush his medications and this medication being recommended not to crush. The patient did not have any problems with this medication.    With these changes and supports the patient noticed improvement in their symptoms and felt sufficiently ready for discharge. As a result, Bandar Lawson was discharged. At the time of discharge, Bandar Lawson was determined to not be a danger to self or others. At the current time of discharge, the patient does not meet criteria for involuntary hospitalization. On the day of discharge, the patient reports that they do not have suicidal or homicidal ideation. Steps taken to minimize risk include: assessing patient s behavior and thought process daily during hospital stay, discharging patient with adequate plan for follow up for mental and physical health and discussing safety plan of returning to the hospital should the patient ever have thoughts of harming themselves or others. Therefore, based on  all available evidence including the factors cited above, the patient does not appear to be at imminent risk for self-harm, and is appropriate for outpatient level of care. However, if patient uses substances or is medication non-adherent, their risk of decompensation and SI will be elevated. This was discussed with the patient.    Medical Course:    The patient was medically cleared for admission to inpatient psychiatry. The following medical issues arose below. The patient was medically stable at the time of discharge.     #. DM  - Jardiance 25 mg daily  - Lopid 600 mg BID reduced to 600 mg daily given patient just wanting to use once a day and doing this at home  - Metformin XR 2,000 mg BID being changed to Metformin 500 mg BID due to patient needing a medication that could be crushed and starting a lower dose given unclear how much Metformin he was actually getting into his system prior due to crushing. PCP to address.   - Lantus 70 U subQ at bedtime   - Victoza 1.8 mg subQ daily   - Aspirin 81 mg daily changed to chewable form  - Hypoglycemia protocol   - Diabetic educator consulted        DISCHARGE MEDICATIONS     Current Discharge Medication List      START taking these medications    Details   aspirin (ASA) 81 MG chewable tablet Take 1 tablet (81 mg) by mouth daily  Qty: 30 tablet, Refills: 1    Associated Diagnoses: Type 2 diabetes mellitus without complication, with long-term current use of insulin (H)      metFORMIN (GLUCOPHAGE) 500 MG tablet Take 1 tablet (500 mg) by mouth 2 times daily (with meals)  Qty: 60 tablet, Refills: 1    Associated Diagnoses: Type 2 diabetes mellitus without complication, with long-term current use of insulin (H)      risperiDONE (RISPERDAL M-TABS) 1 MG ODT Place 1 tablet (1 mg) under the tongue 2 times daily  Qty: 60 tablet, Refills: 1    Associated Diagnoses: Schizoaffective disorder, bipolar type (H)         CONTINUE these medications which have CHANGED    Details  "  gemfibrozil (LOPID) 600 MG tablet Take 1 tablet (600 mg) by mouth daily    Associated Diagnoses: Hyperlipidemia LDL goal <100         CONTINUE these medications which have NOT CHANGED    Details   empagliflozin (JARDIANCE) 25 MG TABS tablet Take 25 mg by mouth daily (Patient crushes med to take.)      insulin glargine (LANTUS PEN) 100 UNIT/ML pen Inject 70 Units Subcutaneous At Bedtime      liraglutide (VICTOZA) 18 MG/3ML solution Inject 1.8 mg Subcutaneous daily      ONETOUCH ULTRA test strip Use as directed to test blood glucose up to twice daily.      paliperidone (INVEGA SUSTENNA) 156 MG/ML ALIDA injection Inject 156 mg into the muscle every 28 days Last given 9/14/2022      B-D U/F 31G X 8 MM insulin pen needle Use as directed with lantus pen.         STOP taking these medications       aspirin (ASA) 81 MG EC tablet Comments:   Reason for Stopping:         metFORMIN (GLUCOPHAGE XR) 500 MG 24 hr tablet Comments:   Reason for Stopping:             Reason for two or more neuroleptics: Patient needing oral and CUTLER product. M-Tabs needed given cannot crush Invega tablets.        MENTAL STATUS EXAM   Vitals: /77 (BP Location: Right arm)   Pulse 90   Temp 97.5  F (36.4  C) (Temporal)   Resp 16   Ht 1.854 m (6' 1\")   Wt 119.5 kg (263 lb 6.4 oz)   SpO2 98%   BMI 34.75 kg/m      Appearance:  awake, alert  Attitude:  cooperative  Eye Contact:  good, reduced blinking  Mood:  \"alright\"  Affect:  intensity is flat  Speech:  clear, coherent  Psychomotor Behavior:  no evidence of tardive dyskinesia, dystonia, or tics  Thought Process:  More linear   Associations:  loosening of associations present  Thought Content:  Denies SI and HI. AH present. Paranoia present. Improved from addmission  Insight:  fair  Judgment:  fair  Oriented to:  time, person, and place  Attention Span and Concentration:  fair  Recent and Remote Memory:  fair  Language: Able to name objects, Able to repeat phrases and Able to read and " write  Fund of Knowledge: low-normal  Muscle Strength and Tone: normal  Gait and Station: Normal       DISCHARGE PLAN     1.  Education given regarding diagnostic and treatment options with risks, benefits and alternatives with adequate verbalization of understanding.  2.  Discharge to home with patient to live with parents. Upon detailed review of risk factors, patient amenable for release.   3.  Continue aforementioned medications and associated medication changes with follow-up by outpatient provider.  4.  Crisis management planning in place.    5.  Nursing and  to review further discharge recommendations.   6.  Patient is being discharged with the following appointments:    Health Care Follow-up:      [facility]  Therapy intake- Wednesday September 28th @ 1:00 PM.  Address: [address] [place] [address]  Phone: [phone]     [facility]   [employer] - Joyce Drakeon:  [address] [place] [address]  Phone: [phone]  Fax: [phone]     7. General discharge instructions:       Reason for your hospital stay    Psychosis.     Follow-up and recommended labs and tests    See SW Recommendations for outpatient follow-up appointments. No follow-up labs.     Activity    Your activity upon discharge: activity as tolerated.     Discharge Instructions    Please continue to take your medications as prescribed. Please exercise, manage stress, have healthy relationships, eat healthy, and sleep at a regular amount. You should continue Risperdal M-tabs for the next 1-4 months depending on how you respond to the increased dose of Invega Sustenna. You should just need the CUTLER once it reaches a steady state in your blood. Please return to the ED if your symptoms of psychosis worsen.         DISCHARGE SERVICES PROVIDED     45 minutes spent on discharge services, including:  Final examination of patient.  Review and discussion of hospital  stay.  Instructions for continued outpatient care/goals.  Preparation of discharge records.  Preparation of medications refills and new prescriptions.  Preparation of applicable referral forms.        ATTESTATION     Dr. Marquise Vaughan  Psychiatrist          LABS THIS ADMISSION     Results for orders placed or performed during the hospital encounter of 09/16/22   Glucose by meter     Status: Normal   Result Value Ref Range    GLUCOSE BY METER POCT 74 70 - 99 mg/dL   Glucose by meter     Status: Abnormal   Result Value Ref Range    GLUCOSE BY METER POCT 129 (H) 70 - 99 mg/dL   Glucose by meter     Status: Abnormal   Result Value Ref Range    GLUCOSE BY METER POCT 141 (H) 70 - 99 mg/dL   Glucose by meter     Status: Abnormal   Result Value Ref Range    GLUCOSE BY METER POCT 128 (H) 70 - 99 mg/dL   Glucose by meter     Status: Normal   Result Value Ref Range    GLUCOSE BY METER POCT 89 70 - 99 mg/dL   Glucose by meter     Status: Normal   Result Value Ref Range    GLUCOSE BY METER POCT 75 70 - 99 mg/dL   Glucose by meter     Status: Normal   Result Value Ref Range    GLUCOSE BY METER POCT 81 70 - 99 mg/dL   Glucose by meter     Status: Abnormal   Result Value Ref Range    GLUCOSE BY METER POCT 145 (H) 70 - 99 mg/dL   Glucose by meter     Status: Normal   Result Value Ref Range    GLUCOSE BY METER POCT 76 70 - 99 mg/dL   Glucose by meter     Status: Abnormal   Result Value Ref Range    GLUCOSE BY METER POCT 152 (H) 70 - 99 mg/dL   Glucose by meter     Status: Abnormal   Result Value Ref Range    GLUCOSE BY METER POCT 101 (H) 70 - 99 mg/dL

## 2022-09-21 NOTE — PLAN OF CARE
Problem: Behavioral Health Plan of Care  Goal: Patient-Specific Goal (Individualization)  Description: Pt will sleep at least 6-8 hours   Pt will eat at least 50% of meals   Pt will be medication compliant   Pt will follow treatment team recommendations   Pt will attend at least 50% of groups   Patient prefers to have pills crushed and put in water.     Outcome: Ongoing, Progressing     Pt in bed at the start of the shift. Pt denies pain, SI, HI, hallucinations and depression. Pt refused his Metformin. Pt asking about discharge, encourtaged him to talk to provider.    Pt will be leaving this afternoon. Pt's parents will be picking him up between 3pm-4pm.      Problem: Suicide Risk  Goal: Absence of Self-Harm  Description: Pt will be safe from self injury while on the unit   Pt will seek out staff if feeling an increase in suicidal ideations   Pt will report decrease in SI by the time of discharge       Outcome: Ongoing, Progressing     Problem: Suicidal Behavior  Goal: Suicidal Behavior is Absent or Managed  Outcome: Ongoing, Progressing   Goal Outcome Evaluation:    Plan of Care Reviewed With: patient            Discharge Note    Patient Discharged to home on 9/21/2022 3:00 PM via Private Car accompanied by security and transportation.     Patient informed of discharge instructions in AVS. patient verbalizes understanding and denies having any questions pertaining to AVS. Patient stable at time of discharge. Patient denies SI, HI, and thoughts of self harm at time of discharge. All personal belongings returned to patient. Discharge prescriptions sent to [facility] in [place] via electronic communication.  AVS and discharge summary sent home with pt.     Tresa Zamorano RN  9/21/2022  3:00 PM

## 2022-09-21 NOTE — PLAN OF CARE
Scheduled therapy for pt this morning (see discharge instructions). Pt is planing to discharge this afternoon with parents, encouraged pt to clal parents and verify for nursing so they could get him ready at the appropriate time.

## 2022-09-21 NOTE — PLAN OF CARE
Problem: Behavioral Health Plan of Care  Goal: Patient-Specific Goal (Individualization)  Description: Pt will sleep at least 6-8 hours   Pt will eat at least 50% of meals   Pt will be medication compliant   Pt will follow treatment team recommendations   Pt will attend at least 50% of groups   Patient prefers to have pills crushed and put in water.     Outcome: Ongoing, Progressing  Note: 15:40: Received end of shift report from JULIO CESAR Rios.    21:25:      Problem: Suicide Risk  Goal: Absence of Self-Harm  Description: Pt will be safe from self injury while on the unit   Pt will seek out staff if feeling an increase in suicidal ideations   Pt will report decrease in SI by the time of discharge       Outcome: Ongoing, Progressing     Problem: Suicidal Behavior  Goal: Suicidal Behavior is Absent or Managed  Outcome: Ongoing, Progressing   Goal Outcome Evaluation:

## 2022-09-21 NOTE — PLAN OF CARE
Pt is discharging at the recommendation of the treatment team. Pt is discharging to home transported by parents. Pt denies having any thoughts of hurting themself or anyone else. Pt denies anxiety or depression. Pt has follow up with [facility]. Discharge instructions, including; demographic sheet, psychiatric evaluation, discharge summary, and AVS were faxed to these next level of care providers.

## 2022-09-26 DIAGNOSIS — F25.0 SCHIZOAFFECTIVE DISORDER, BIPOLAR TYPE (H): Primary | ICD-10-CM

## 2022-09-26 RX ORDER — RISPERIDONE 1 MG/1
1 TABLET ORAL 2 TIMES DAILY PRN
Qty: 60 TABLET | Refills: 0 | Status: ON HOLD | OUTPATIENT
Start: 2022-09-26 | End: 2022-10-10

## 2022-10-06 ENCOUNTER — HOSPITAL ENCOUNTER (INPATIENT)
Facility: HOSPITAL | Age: 47
LOS: 5 days | Discharge: HOME | DRG: 885 | End: 2022-10-11
Attending: STUDENT IN AN ORGANIZED HEALTH CARE EDUCATION/TRAINING PROGRAM | Admitting: PSYCHIATRY & NEUROLOGY
Payer: COMMERCIAL

## 2022-10-06 DIAGNOSIS — F29 PSYCHOSIS, UNSPECIFIED PSYCHOSIS TYPE (H): ICD-10-CM

## 2022-10-06 DIAGNOSIS — F22 PARANOID DELUSION (H): Primary | ICD-10-CM

## 2022-10-06 DIAGNOSIS — F22 PARANOIA (H): ICD-10-CM

## 2022-10-06 LAB
ALBUMIN UR-MCNC: NEGATIVE MG/DL
ANION GAP SERPL CALCULATED.3IONS-SCNC: 5 MMOL/L (ref 3–14)
APPEARANCE UR: CLEAR
BASOPHILS # BLD AUTO: 0.1 10E3/UL (ref 0–0.2)
BASOPHILS NFR BLD AUTO: 1 %
BILIRUB UR QL STRIP: NEGATIVE
BUN SERPL-MCNC: 19 MG/DL (ref 7–30)
CALCIUM SERPL-MCNC: 9.8 MG/DL (ref 8.5–10.1)
CHLORIDE BLD-SCNC: 109 MMOL/L (ref 94–109)
CO2 SERPL-SCNC: 23 MMOL/L (ref 20–32)
COLOR UR AUTO: ABNORMAL
CREAT SERPL-MCNC: 0.8 MG/DL (ref 0.66–1.25)
EOSINOPHIL # BLD AUTO: 0.1 10E3/UL (ref 0–0.7)
EOSINOPHIL NFR BLD AUTO: 1 %
ERYTHROCYTE [DISTWIDTH] IN BLOOD BY AUTOMATED COUNT: 14.1 % (ref 10–15)
FLUAV RNA SPEC QL NAA+PROBE: NEGATIVE
FLUBV RNA RESP QL NAA+PROBE: NEGATIVE
GFR SERPL CREATININE-BSD FRML MDRD: >90 ML/MIN/1.73M2
GLUCOSE BLD-MCNC: 90 MG/DL (ref 70–99)
GLUCOSE BLDC GLUCOMTR-MCNC: 122 MG/DL (ref 70–99)
GLUCOSE UR STRIP-MCNC: >1000 MG/DL
HCT VFR BLD AUTO: 46.5 % (ref 40–53)
HGB BLD-MCNC: 15.4 G/DL (ref 13.3–17.7)
HGB UR QL STRIP: NEGATIVE
HOLD SPECIMEN: NORMAL
IMM GRANULOCYTES # BLD: 0 10E3/UL
IMM GRANULOCYTES NFR BLD: 0 %
KETONES UR STRIP-MCNC: NEGATIVE MG/DL
LEUKOCYTE ESTERASE UR QL STRIP: NEGATIVE
LYMPHOCYTES # BLD AUTO: 3.4 10E3/UL (ref 0.8–5.3)
LYMPHOCYTES NFR BLD AUTO: 35 %
MCH RBC QN AUTO: 29.4 PG (ref 26.5–33)
MCHC RBC AUTO-ENTMCNC: 33.1 G/DL (ref 31.5–36.5)
MCV RBC AUTO: 89 FL (ref 78–100)
MONOCYTES # BLD AUTO: 0.6 10E3/UL (ref 0–1.3)
MONOCYTES NFR BLD AUTO: 6 %
MUCOUS THREADS #/AREA URNS LPF: PRESENT /LPF
NEUTROPHILS # BLD AUTO: 5.8 10E3/UL (ref 1.6–8.3)
NEUTROPHILS NFR BLD AUTO: 57 %
NITRATE UR QL: NEGATIVE
NRBC # BLD AUTO: 0 10E3/UL
NRBC BLD AUTO-RTO: 0 /100
PH UR STRIP: 7 [PH] (ref 4.7–8)
PLATELET # BLD AUTO: 306 10E3/UL (ref 150–450)
POTASSIUM BLD-SCNC: 4.3 MMOL/L (ref 3.4–5.3)
RBC # BLD AUTO: 5.23 10E6/UL (ref 4.4–5.9)
RBC URINE: <1 /HPF
RSV RNA SPEC NAA+PROBE: NEGATIVE
SARS-COV-2 RNA RESP QL NAA+PROBE: NEGATIVE
SODIUM SERPL-SCNC: 137 MMOL/L (ref 133–144)
SP GR UR STRIP: 1.03 (ref 1–1.03)
SQUAMOUS EPITHELIAL: 1 /HPF
UROBILINOGEN UR STRIP-MCNC: NORMAL MG/DL
WBC # BLD AUTO: 9.9 10E3/UL (ref 4–11)
WBC URINE: 1 /HPF

## 2022-10-06 PROCEDURE — 250N000013 HC RX MED GY IP 250 OP 250 PS 637: Performed by: STUDENT IN AN ORGANIZED HEALTH CARE EDUCATION/TRAINING PROGRAM

## 2022-10-06 PROCEDURE — C9803 HOPD COVID-19 SPEC COLLECT: HCPCS

## 2022-10-06 PROCEDURE — 99285 EMERGENCY DEPT VISIT HI MDM: CPT | Performed by: STUDENT IN AN ORGANIZED HEALTH CARE EDUCATION/TRAINING PROGRAM

## 2022-10-06 PROCEDURE — 87637 SARSCOV2&INF A&B&RSV AMP PRB: CPT | Performed by: STUDENT IN AN ORGANIZED HEALTH CARE EDUCATION/TRAINING PROGRAM

## 2022-10-06 PROCEDURE — 250N000013 HC RX MED GY IP 250 OP 250 PS 637: Performed by: NURSE PRACTITIONER

## 2022-10-06 PROCEDURE — 124N000001 HC R&B MH

## 2022-10-06 PROCEDURE — 36415 COLL VENOUS BLD VENIPUNCTURE: CPT | Performed by: STUDENT IN AN ORGANIZED HEALTH CARE EDUCATION/TRAINING PROGRAM

## 2022-10-06 PROCEDURE — 99291 CRITICAL CARE FIRST HOUR: CPT

## 2022-10-06 PROCEDURE — 99292 CRITICAL CARE ADDL 30 MIN: CPT

## 2022-10-06 PROCEDURE — 81001 URINALYSIS AUTO W/SCOPE: CPT | Performed by: STUDENT IN AN ORGANIZED HEALTH CARE EDUCATION/TRAINING PROGRAM

## 2022-10-06 PROCEDURE — 250N000013 HC RX MED GY IP 250 OP 250 PS 637: Performed by: PSYCHIATRY & NEUROLOGY

## 2022-10-06 PROCEDURE — 250N000012 HC RX MED GY IP 250 OP 636 PS 637: Performed by: PSYCHIATRY & NEUROLOGY

## 2022-10-06 PROCEDURE — 85014 HEMATOCRIT: CPT | Performed by: STUDENT IN AN ORGANIZED HEALTH CARE EDUCATION/TRAINING PROGRAM

## 2022-10-06 PROCEDURE — 99222 1ST HOSP IP/OBS MODERATE 55: CPT | Performed by: NURSE PRACTITIONER

## 2022-10-06 PROCEDURE — 80048 BASIC METABOLIC PNL TOTAL CA: CPT | Performed by: STUDENT IN AN ORGANIZED HEALTH CARE EDUCATION/TRAINING PROGRAM

## 2022-10-06 RX ORDER — DEXTROSE MONOHYDRATE 25 G/50ML
25-50 INJECTION, SOLUTION INTRAVENOUS
Status: DISCONTINUED | OUTPATIENT
Start: 2022-10-06 | End: 2022-10-11 | Stop reason: HOSPADM

## 2022-10-06 RX ORDER — NICOTINE POLACRILEX 4 MG
15-30 LOZENGE BUCCAL
Status: DISCONTINUED | OUTPATIENT
Start: 2022-10-06 | End: 2022-10-11 | Stop reason: HOSPADM

## 2022-10-06 RX ORDER — ACETAMINOPHEN 325 MG/1
650 TABLET ORAL EVERY 4 HOURS PRN
Status: DISCONTINUED | OUTPATIENT
Start: 2022-10-06 | End: 2022-10-11 | Stop reason: HOSPADM

## 2022-10-06 RX ORDER — LIRAGLUTIDE 6 MG/ML
1.8 INJECTION SUBCUTANEOUS DAILY
Status: DISCONTINUED | OUTPATIENT
Start: 2022-10-06 | End: 2022-10-11 | Stop reason: HOSPADM

## 2022-10-06 RX ORDER — ASPIRIN 81 MG/1
81 TABLET, CHEWABLE ORAL DAILY
Status: DISCONTINUED | OUTPATIENT
Start: 2022-10-07 | End: 2022-10-11 | Stop reason: HOSPADM

## 2022-10-06 RX ORDER — OLANZAPINE 10 MG/2ML
10 INJECTION, POWDER, FOR SOLUTION INTRAMUSCULAR 3 TIMES DAILY PRN
Status: DISCONTINUED | OUTPATIENT
Start: 2022-10-06 | End: 2022-10-11 | Stop reason: HOSPADM

## 2022-10-06 RX ORDER — RISPERIDONE 0.5 MG/1
1 TABLET, ORALLY DISINTEGRATING ORAL 2 TIMES DAILY
Status: DISCONTINUED | OUTPATIENT
Start: 2022-10-06 | End: 2022-10-07

## 2022-10-06 RX ORDER — GEMFIBROZIL 600 MG/1
600 TABLET, FILM COATED ORAL DAILY
Status: DISCONTINUED | OUTPATIENT
Start: 2022-10-07 | End: 2022-10-11 | Stop reason: HOSPADM

## 2022-10-06 RX ORDER — HYDROXYZINE HYDROCHLORIDE 25 MG/1
25 TABLET, FILM COATED ORAL EVERY 4 HOURS PRN
Status: DISCONTINUED | OUTPATIENT
Start: 2022-10-06 | End: 2022-10-11 | Stop reason: HOSPADM

## 2022-10-06 RX ORDER — OLANZAPINE 10 MG/1
10 TABLET ORAL 3 TIMES DAILY PRN
Status: DISCONTINUED | OUTPATIENT
Start: 2022-10-06 | End: 2022-10-11 | Stop reason: HOSPADM

## 2022-10-06 RX ORDER — NICOTINE 21 MG/24HR
1 PATCH, TRANSDERMAL 24 HOURS TRANSDERMAL DAILY
Status: DISCONTINUED | OUTPATIENT
Start: 2022-10-06 | End: 2022-10-11 | Stop reason: HOSPADM

## 2022-10-06 RX ADMIN — NICOTINE POLACRILEX 2 MG: 2 GUM, CHEWING BUCCAL at 17:32

## 2022-10-06 RX ADMIN — INSULIN GLARGINE 70 UNITS: 100 INJECTION, SOLUTION SUBCUTANEOUS at 20:30

## 2022-10-06 RX ADMIN — NICOTINE 1 PATCH: 21 PATCH, EXTENDED RELEASE TRANSDERMAL at 15:23

## 2022-10-06 RX ADMIN — RISPERIDONE 1 MG: 0.5 TABLET, ORALLY DISINTEGRATING ORAL at 20:29

## 2022-10-06 ASSESSMENT — ACTIVITIES OF DAILY LIVING (ADL)
FALL_HISTORY_WITHIN_LAST_SIX_MONTHS: NO
SWALLOWING: 2-->DIFFICULTY SWALLOWING FOODS
DRESS: 0-->INDEPENDENT
SWALLOWING: 0-->SWALLOWS FOODS/LIQUIDS WITHOUT DIFFICULTY
EATING: 0-->INDEPENDENT
DRESS: 0-->INDEPENDENT
LAUNDRY: UNABLE TO COMPLETE
CHANGE_IN_FUNCTIONAL_STATUS_SINCE_ONSET_OF_CURRENT_ILLNESS/INJURY: NO
CONCENTRATING,_REMEMBERING_OR_MAKING_DECISIONS_DIFFICULTY: NO
DRESS: INDEPENDENT;SCRUBS (BEHAVIORAL HEALTH)
ADLS_ACUITY_SCORE: 35
ADLS_ACUITY_SCORE: 39
ORAL_HYGIENE: INDEPENDENT
DRESSING/BATHING: BATHING DIFFICULTY, REQUIRES EQUIPMENT
WALKING_OR_CLIMBING_STAIRS_DIFFICULTY: NO
ADLS_ACUITY_SCORE: 45
EATING/SWALLOWING: OTHER (SEE COMMENTS)
DRESSING/BATHING_DIFFICULTY: YES
ADLS_ACUITY_SCORE: 45
DOING_ERRANDS_INDEPENDENTLY_DIFFICULTY: NO
WEAR_GLASSES_OR_BLIND: NO
HYGIENE/GROOMING: INDEPENDENT
DIFFICULTY_EATING/SWALLOWING: YES
TOILETING_ISSUES: NO
ADLS_ACUITY_SCORE: 45
BATHING: 1-->ASSISTANCE NEEDED
EATING: 0-->INDEPENDENT

## 2022-10-06 NOTE — ED NOTES
PCS updated that patient can not go up to 5 until 1530, patient and parents updated, parents going home at this time.

## 2022-10-06 NOTE — ED TRIAGE NOTES
"Patient reports that \"he is fearful that someone is going to get me and they are coming to get me, I don't know what to do. I can't go out in public.\" Patient became tearful and states, \"Someone is after me.\" Patient reports auditory hallucinations, \"I can't get the voices out of my head.\" Mom reports that patient was making comments about \"getting a gun and ending it all.\" Patient reports being on 5 9/16/22-9/21/22 where they added meds, patient states, \"they aren't working very well and I am going to need something different.\"     "

## 2022-10-06 NOTE — CONSULTS
Request for assessment cancelled per ED RN as direct admit is the plan. DEC available if needed [phone]

## 2022-10-06 NOTE — H&P
[facility]    History and Physical  Medical Services       Date of Admission:  10/6/2022  Date of Service (when I saw the patient): 10/06/22    Assessment & Plan     Principal Problem:    Paranoia (H)    Active Medical Problem:  Type 2 diabetes on insulin- last A1c 8/12 was 7.4. home dose of jardiance, Metformin, victoza, lantus 70 units at bedtime ordered. Consistent carb diet, Accuchecks qid, bedtime, and 0200 with sliding scale ordered. Pt states he is not going to allow but 2 accuchecks a day as this is what he does at home. Explained that it would remain ordered with sliding scale and he can refuse them, but did encourage him to allow while here.     ED course- influenza, RSV, Covid negative, UA shows no infection, CBC and BMP wnl.         Code Status: Full Code    Paola Boo, CNP    Primary Care Physician   Physician No Ref-Primary    Chief Complaint   Psych evaluation     History is obtained from the patient and medical chart     History of Present Illness   (per ED) Bandar Lawson is a 47 year old male with psychiatric history including paranoia, paranoid delusions, psychosis presents to the emergency department today saying that the voices that he hears are worsening.  He also mentioned that he was planning to kill himself with a gun.  His family is here at the bedside and says that he should not have access to weapons but then they equivocates and suggest that perhaps he does.  He states he thinks he needs to come into his last stay towards the end of September which involved medication changes put him on medications that are not working well for him and he thinks those need to be changed.  No homicidal ideation.  Denies drugs and alcohol.    Past Medical History    I have reviewed this patient's medical history and updated it with pertinent information if needed.   History reviewed. No pertinent past medical history.    Past Surgical History   I have reviewed this patient's surgical history  and updated it with pertinent information if needed.  History reviewed. No pertinent surgical history.    Prior to Admission Medications   Prior to Admission Medications   Prescriptions Last Dose Informant Patient Reported? Taking?   B-D U/F 31G X 8 MM insulin pen needle   Yes No   Sig: Use as directed with lantus pen.   ONETOUCH ULTRA test strip   Yes No   Sig: Use as directed to test blood glucose up to twice daily.   aspirin (ASA) 81 MG chewable tablet 10/6/2022 at Unknown time  No Yes   Sig: Take 1 tablet (81 mg) by mouth daily   empagliflozin (JARDIANCE) 25 MG TABS tablet 10/6/2022 at Unknown time  Yes Yes   Sig: Take 25 mg by mouth daily (Patient crushes med to take.)   gemfibrozil (LOPID) 600 MG tablet 10/6/2022 at Unknown time  No Yes   Sig: Take 1 tablet (600 mg) by mouth daily   insulin glargine (LANTUS PEN) 100 UNIT/ML pen 10/5/2022 at Unknown time  Yes Yes   Sig: Inject 70 Units Subcutaneous At Bedtime   liraglutide (VICTOZA) 18 MG/3ML solution 10/5/2022 at Unknown time  Yes Yes   Sig: Inject 1.8 mg Subcutaneous daily   metFORMIN (GLUCOPHAGE) 500 MG tablet 10/6/2022 at Unknown time  No Yes   Sig: Take 1 tablet (500 mg) by mouth 2 times daily (with meals)   paliperidone (INVEGA SUSTENNA) 156 MG/ML ALIDA injection Past Month at Unknown time  Yes Yes   Sig: Inject 156 mg into the muscle every 28 days Last given 9/14/2022   risperiDONE (RISPERDAL M-TABS) 1 MG ODT 10/6/2022 at Unknown time  No Yes   Sig: Place 1 tablet (1 mg) under the tongue 2 times daily   risperiDONE (RISPERDAL) 1 MG tablet   No No   Sig: Take 1 tablet (1 mg) by mouth 2 times daily as needed (anxiety, psychosis (may crush if necessary, mix powder in 1 teaspon of soft food))      Facility-Administered Medications: None     Allergies   No Known Allergies    Social History   I have reviewed this patient's social history and updated it with pertinent information if needed. Bandar Lawson  reports that he has been smoking cigarettes and vaping  device. He has a 37.50 pack-year smoking history. He has never used smokeless tobacco. He reports that he does not drink alcohol and does not use drugs.    Family History   I have reviewed this patient's family history and updated it with pertinent information if needed.   Family History   Problem Relation Age of Onset     Diabetes Brother        Review of Systems   CONSTITUTIONAL:  negative  EYES:  negative  HEENT:  negative  RESPIRATORY:  negative  CARDIOVASCULAR:  negative  GASTROINTESTINAL:  negative  GENITOURINARY:  negative  INTEGUMENT/BREAST:  negative  HEMATOLOGIC/LYMPHATIC:  negative  ALLERGIC/IMMUNOLOGIC:  negative  ENDOCRINE:  negative  MUSCULOSKELETAL:  negative  NEUROLOGICAL:  negative    Physical Exam   Temp: 97.5  F (36.4  C) Temp src: Tympanic BP: 110/64 Pulse: 88   Resp: 18 SpO2: 98 % O2 Device: None (Room air)    Vital Signs with Ranges  Temp:  [97.3  F (36.3  C)-97.5  F (36.4  C)] 97.5  F (36.4  C)  Pulse:  [88-89] 88  Resp:  [16-18] 18  BP: (110-134)/(64-82) 110/64  SpO2:  [96 %-98 %] 98 %  0 lbs 0 oz    Constitutional: awake, alert, cooperative, no apparent distress, and appears stated age, vitals stable   Eyes: Lids and lashes normal, pupils equal, round and reactive to light, extra ocular muscles intact, sclera clear, conjunctiva normal  ENT: Normocephalic, without obvious abnormality, atraumatic, external ears without lesions, oral pharynx with moist mucous membranes, no erythema or exudates  Hematologic / Lymphatic: no cervical lymphadenopathy  Respiratory: No increased work of breathing, good air exchange, clear to auscultation bilaterally, no crackles or wheezing  Cardiovascular: Normal apical impulse, regular rate and rhythm, normal S1 and S2, no S3 or S4, and no murmur noted  GI: normal bowel sounds, soft, non-distended, non-tender, no masses palpated, no hepatosplenomegally  Genitounirinary: deferred  Skin: normal skin color, texture, turgor, no redness, warmth, or swelling and no  rashes  Musculoskeletal: There is no redness, warmth, or swelling of the joints.  Full range of motion noted.   Neurologic: Awake, alert, oriented to name, place and time.   Neuropsychiatric: General: restricted,  calm and normal eye contact    Data   Data reviewed today:   Recent Labs   Lab 10/06/22  1344   WBC 9.9   HGB 15.4   MCV 89         POTASSIUM 4.3   CHLORIDE 109   CO2 23   BUN 19   CR 0.80   ANIONGAP 5   HENNA 9.8   GLC 90       No results found for this or any previous visit (from the past 24 hour(s)).

## 2022-10-06 NOTE — ED PROVIDER NOTES
History     Chief Complaint   Patient presents with     Psychiatric Evaluation     HPI  Bandar Lawson is a 47 year old male with psychiatric history including paranoia, paranoid delusions, psychosis presents to the emergency department today saying that the voices that he hears are worsening.  He also mentioned that he was planning to kill himself with a gun.  His family is here at the bedside and says that he should not have access to weapons but then they equivocates and suggest that perhaps he does.  He states he thinks he needs to come into his last stay towards the end of September which involved medication changes put him on medications that are not working well for him and he thinks those need to be changed.  No homicidal ideation.  Denies drugs and alcohol.    Allergies:  No Known Allergies    Problem List:    Patient Active Problem List    Diagnosis Date Noted     Paranoia (H) 10/06/2022     Priority: Medium     Paranoid delusion (H) 09/16/2022     Priority: Medium     Psychosis (H) 03/10/2017     Priority: Medium     CARDIOVASCULAR SCREENING; LDL GOAL LESS THAN 160 12/18/2015     Priority: Medium        Past Medical History:    History reviewed. No pertinent past medical history.    Past Surgical History:    History reviewed. No pertinent surgical history.    Family History:    Family History   Problem Relation Age of Onset     Diabetes Brother        Social History:  Marital Status:  Single [1]  Social History     Tobacco Use     Smoking status: Current Every Day Smoker     Packs/day: 1.50     Years: 25.00     Pack years: 37.50     Types: Cigarettes, Vaping Device     Smokeless tobacco: Never Used   Substance Use Topics     Alcohol use: No     Drug use: No        Medications:    aspirin (ASA) 81 MG chewable tablet  empagliflozin (JARDIANCE) 25 MG TABS tablet  gemfibrozil (LOPID) 600 MG tablet  insulin glargine (LANTUS PEN) 100 UNIT/ML pen  liraglutide (VICTOZA) 18 MG/3ML solution  metFORMIN (GLUCOPHAGE)  500 MG tablet  paliperidone (INVEGA SUSTENNA) 156 MG/ML ALIDA injection  risperiDONE (RISPERDAL M-TABS) 1 MG ODT  B-D U/F 31G X 8 MM insulin pen needle  ONETOUCH ULTRA test strip  risperiDONE (RISPERDAL) 1 MG tablet          Review of Systems  A complete review of systems was performed and is otherwise negative.     Physical Exam   BP: 134/82  Pulse: 88  Temp: 97.3  F (36.3  C)  Resp: 16  SpO2: 96 %      Physical Exam  Constitutional: Alert and conversant. NAD   HENT: NCAT   Eyes: Normal pupils   Neck: supple   CV: No pallor  Pulmonary/Chest: Non-labored respirations  Abdominal: non-distended   MSK: ZELAYA.   Neuro: Alert and appropriate   Skin: Warm and dry. No diaphoresis. No rashes on exposed skin    Psych: Mood and affect: Flat. Eye contact: Normal. Internal stimuli: None obvious during our interaction, however, patient indicates he hears voices. Thought process and content: Linear and goal oriented. Speech: A little retarded. Grooming: In hospital scrubs. Suicidal Ideation: Present.    ED Course           ED Course as of 10/06/22 1359   u Oct 06, 2022   1358 47-year-old male with recent admission here presents paranoid concerned about suicidal ideation, discussing getting a gun and shooting himself.  According to his parents they do not think he has access to guns but cannot say for sure.  He is paranoid but has enough insight to realize that he needs help and wants to come in and I plan to help facilitate that.  Case was discussed with Dr. Rangel who graciously has agreed to admit the patient.     Procedures            Results for orders placed or performed during the hospital encounter of 10/06/22 (from the past 24 hour(s))   UA with Microscopic reflex to Culture    Specimen: Urine, Clean Catch   Result Value Ref Range    Color Urine Light Yellow Colorless, Straw, Light Yellow, Yellow    Appearance Urine Clear Clear    Glucose Urine >1000 (A) Negative mg/dL    Bilirubin Urine Negative Negative    Ketones  Urine Negative Negative mg/dL    Specific Gravity Urine 1.032 1.003 - 1.035    Blood Urine Negative Negative    pH Urine 7.0 4.7 - 8.0    Protein Albumin Urine Negative Negative mg/dL    Urobilinogen Urine Normal Normal, 2.0 mg/dL    Nitrite Urine Negative Negative    Leukocyte Esterase Urine Negative Negative    Mucus Urine Present (A) None Seen /LPF    RBC Urine <1 <=2 /HPF    WBC Urine 1 <=5 /HPF    Squamous Epithelials Urine 1 <=1 /HPF    Narrative    Urine Culture not indicated   CBC with platelets differential    Narrative    The following orders were created for panel order CBC with platelets differential.  Procedure                               Abnormality         Status                     ---------                               -----------         ------                     CBC with platelets and d...[[id number]]                      Final result                 Please view results for these tests on the individual orders.   CBC with platelets and differential   Result Value Ref Range    WBC Count 9.9 4.0 - 11.0 10e3/uL    RBC Count 5.23 4.40 - 5.90 10e6/uL    Hemoglobin 15.4 13.3 - 17.7 g/dL    Hematocrit 46.5 40.0 - 53.0 %    MCV 89 78 - 100 fL    MCH 29.4 26.5 - 33.0 pg    MCHC 33.1 31.5 - 36.5 g/dL    RDW 14.1 10.0 - 15.0 %    Platelet Count 306 150 - 450 10e3/uL    % Neutrophils 57 %    % Lymphocytes 35 %    % Monocytes 6 %    % Eosinophils 1 %    % Basophils 1 %    % Immature Granulocytes 0 %    NRBCs per 100 WBC 0 <1 /100    Absolute Neutrophils 5.8 1.6 - 8.3 10e3/uL    Absolute Lymphocytes 3.4 0.8 - 5.3 10e3/uL    Absolute Monocytes 0.6 0.0 - 1.3 10e3/uL    Absolute Eosinophils 0.1 0.0 - 0.7 10e3/uL    Absolute Basophils 0.1 0.0 - 0.2 10e3/uL    Absolute Immature Granulocytes 0.0 <=0.4 10e3/uL    Absolute NRBCs 0.0 10e3/uL   Extra Tube    Narrative    The following orders were created for panel order Extra Tube.  Procedure                               Abnormality         Status                      ---------                               -----------         ------                     Extra Blue Top Tube[[id number]]                              In process                 Extra Red Top Tube[[id number]]                               In process                 Extra Green Top (Lithium...[[id number]]                      In process                   Please view results for these tests on the individual orders.       Medications - No data to display    Assessments & Plan (with Medical Decision Making)     I have reviewed the nursing notes.    I have reviewed the findings, diagnosis, plan and need for follow up with the patient.      New Prescriptions    No medications on file       Final diagnoses:   Paranoia (H)       10/6/2022   [facility]     Mahin Acuña MD  10/06/22 1827     normal...

## 2022-10-07 LAB
GLUCOSE BLDC GLUCOMTR-MCNC: 110 MG/DL (ref 70–99)
GLUCOSE BLDC GLUCOMTR-MCNC: 94 MG/DL (ref 70–99)

## 2022-10-07 PROCEDURE — 250N000013 HC RX MED GY IP 250 OP 250 PS 637: Performed by: NURSE PRACTITIONER

## 2022-10-07 PROCEDURE — 124N000001 HC R&B MH

## 2022-10-07 PROCEDURE — 250N000013 HC RX MED GY IP 250 OP 250 PS 637: Performed by: PSYCHIATRY & NEUROLOGY

## 2022-10-07 PROCEDURE — 99222 1ST HOSP IP/OBS MODERATE 55: CPT | Mod: AI | Performed by: NURSE PRACTITIONER

## 2022-10-07 PROCEDURE — 99231 SBSQ HOSP IP/OBS SF/LOW 25: CPT | Performed by: NURSE PRACTITIONER

## 2022-10-07 RX ORDER — RISPERIDONE 0.5 MG/1
1 TABLET, ORALLY DISINTEGRATING ORAL ONCE
Status: COMPLETED | OUTPATIENT
Start: 2022-10-07 | End: 2022-10-07

## 2022-10-07 RX ORDER — RISPERIDONE 2 MG/1
2 TABLET, ORALLY DISINTEGRATING ORAL AT BEDTIME
Status: DISCONTINUED | OUTPATIENT
Start: 2022-10-07 | End: 2022-10-08

## 2022-10-07 RX ORDER — RISPERIDONE 0.5 MG/1
1 TABLET, ORALLY DISINTEGRATING ORAL DAILY
Status: DISCONTINUED | OUTPATIENT
Start: 2022-10-08 | End: 2022-10-08

## 2022-10-07 RX ADMIN — INSULIN GLARGINE 70 UNITS: 100 INJECTION, SOLUTION SUBCUTANEOUS at 20:33

## 2022-10-07 RX ADMIN — HYDROXYZINE HYDROCHLORIDE 25 MG: 25 TABLET, FILM COATED ORAL at 16:03

## 2022-10-07 RX ADMIN — ASPIRIN 81 MG 81 MG: 81 TABLET ORAL at 08:37

## 2022-10-07 RX ADMIN — NICOTINE POLACRILEX 2 MG: 2 GUM, CHEWING BUCCAL at 11:05

## 2022-10-07 RX ADMIN — NICOTINE POLACRILEX 2 MG: 2 GUM, CHEWING BUCCAL at 06:26

## 2022-10-07 RX ADMIN — RISPERIDONE 2 MG: 2 TABLET, ORALLY DISINTEGRATING ORAL at 20:27

## 2022-10-07 RX ADMIN — NICOTINE 1 PATCH: 21 PATCH, EXTENDED RELEASE TRANSDERMAL at 08:36

## 2022-10-07 RX ADMIN — GEMFIBROZIL 600 MG: 600 TABLET ORAL at 08:37

## 2022-10-07 RX ADMIN — RISPERIDONE 1 MG: 0.5 TABLET, ORALLY DISINTEGRATING ORAL at 12:08

## 2022-10-07 RX ADMIN — RISPERIDONE 1 MG: 0.5 TABLET, ORALLY DISINTEGRATING ORAL at 08:35

## 2022-10-07 RX ADMIN — NICOTINE POLACRILEX 2 MG: 2 GUM, CHEWING BUCCAL at 16:47

## 2022-10-07 ASSESSMENT — ACTIVITIES OF DAILY LIVING (ADL)
ADLS_ACUITY_SCORE: 39
HYGIENE/GROOMING: INDEPENDENT
HYGIENE/GROOMING: INDEPENDENT
DRESS: SCRUBS (BEHAVIORAL HEALTH);INDEPENDENT
ADLS_ACUITY_SCORE: 39
ADLS_ACUITY_SCORE: 39
ORAL_HYGIENE: INDEPENDENT
ADLS_ACUITY_SCORE: 39
ADLS_ACUITY_SCORE: 39
DRESS: INDEPENDENT
ORAL_HYGIENE: INDEPENDENT
ADLS_ACUITY_SCORE: 39
LAUNDRY: UNABLE TO COMPLETE
ADLS_ACUITY_SCORE: 39
ADLS_ACUITY_SCORE: 39

## 2022-10-07 NOTE — DISCHARGE INSTRUCTIONS
Behavioral Discharge Planning and Instructions    Summary: The patient is a 47 year old male that was admitted for paranoia. Patient shared that when he discharged from this unit on 9/21/2022 he did not take his BH medications regularly.     Main Diagnosis: Psychosis     Health Care Follow-up:     [facility]   Appointment: 10/12/2022 @ 1:00 pm for Medication Management - Joyce Nick  Appointment: 10/14/2022 @ 2:00 pm Patient needs to go to [facility] for his long acting injection as usual.  Appointment: 11/09/2022 @ 11:00 am for Medication Management - Joyce Nick  [address] [place] [address]  Phone: [phone]  Fax: [phone]     [place]  [address] [place] [address] [address]  [place] [address] [phone]    [facility] [facility] [employer] - [place] Patient needs to call their office and provide updated information if needed.   Appointment: 10/18/2022 @ 3:30 pm Individual Therapy with Angelo Watters will call the patient at that time.   Appointment: 10/25/2022 @ 3:30 pm Individual Therapy with Angelo Watters will call the patient at that time.   [email or web address]  [id number] [place] [address] [address], [place], [place] [address] [phone]  Fax: [phone]    [facility]   Crisis Line and Mobile Crisis - [phone]  National [place] for Mental Illness [place] ([facility])  Resources for Elderly and Disabled Adults (PDF)  Crisis Text Line Website - Text [place] to 877236    Attend all scheduled appointments with your outpatient providers. Call at least 24 hours in advance if you need to reschedule an appointment to ensure continued access to your outpatient providers.     Major Treatments, Procedures and Findings:  You were provided with: a psychiatric assessment, assessed for medical stability, medication evaluation and/or management, group therapy, family therapy, individual therapy, CD evaluation/assessment, milieu management, and  "medical interventions    Symptoms to Report: feeling more aggressive, increased confusion, losing more sleep, mood getting worse, or thoughts of suicide    Early warning signs can include: increased depression or anxiety sleep disturbances increased thoughts or behaviors of suicide or self-harm  increased unusual thinking, such as paranoia or hearing voices [phone]      Resources:   Crisis Intervention: 653.603.8968 or 281-110-1343 (TTY: 992.685.4376).  Call anytime for help.  National [place] on Mental Illness (www.[place].andressa.org): 337.150.2006 or 890-091-1223.  [place] Association for Children's Mental Health (www.mac.org): 546.819.7622.  Alcoholics Anonymous (www.alcoholics-anonymous.org): Check your phone book for your local chapter.  Suicide Awareness Voices of Education (SAVE) (www.save.org): 818-780-NQTG (3743)  National Suicide Prevention Line (www.mentalhealthmn.org): 501-903-WDUQ (0257)  Mental Health Consumer/Survivor Network of [place] (www.mhcsn.net): 787.307.5090 or 636-233-2650  Mental Health Association of [place] (www.mentalhealth.org): 884.575.8895 or 145-545-8094  Self- Management and Recovery Training., SMART-- Toll free: 495.188.3788  www.[employer].AlloCure  Text 4 Life: txt \"LIFE\" to 18392 for immediate support and crisis intervention  Crisis text line: Text \"[place]\" to 983169. Free, confidential, 24/7.  Crisis Intervention: 992.719.6987 or 948-005-6369. Call anytime for help.     General Medication Instructions:   See your medication sheet(s) for instructions.   Take all medicines as directed.  Make no changes unless your doctor suggests them.   Go to all your doctor visits.  Be sure to have all your required lab tests. This way, your medicines can be refilled on time.  Do not use any drugs not prescribed by your doctor.  Avoid alcohol.    Advance Directives:   Scanned document on file with [place]? No scanned doc  Is document scanned? Pt states no documents  Honoring Choices Your Rights Handout: Informed " "and given  Was more information offered? Pt declined    The Treatment team has appreciated the opportunity to work with you. If you have any questions or concerns about your recent admission, you can contact the unit which can receive your call 24 hours a day, 7 days a week. They will be able to get in touch with a Provider if needed. The unit number is [phone] .      [place] Area:  [facility], Crisis stabilization [facility]- [phone]  [facility] Crisis Line: 1-225.920.3854  Advocates For Family Peace: 469-6552  Sexual Assault Program [facility]: [phone] or 1-710.932.8314  [facility] Battered Women's Program: 1-256.138.7538 Ext: 279       Calls answered Mon-Fri-8:00 am--4:30 pm    [place]:  Advocates for Family Peace: 1-407.803.1616  [facility] - 3-294-210-5272  [facility] first call for help: 7-503-359-8480  [facility] Crisis Center:  ([phone] [phone]    [place] Area:  Warm Line: 1-121.612.9082       Calls answered Tuesday--Saturday 4:00 pm--10:00 pm  [facility] Crisis Line - [phone]  [facility] Stabilization 299-004-6841    [place] Statewide:  [place] Crisis and Referral Services: 7-051-153-8791  National Suicide Prevention Lifeline: 3-537-166-TALK (0070)   - ovj5rywe- Text \"Life\" to 13653  First Call for Help: 2-1-1  [facility] Helpline- 2-073-JMNC-HELP   Crisis Text Line: Text  [place]  to 733598   "

## 2022-10-07 NOTE — PROGRESS NOTES
10/06/22 2108   Patient Belongings   Did you bring any home meds/supplements to the hospital?  No   Patient Belongings locker;sent to security per site process   Patient Belongings Put in Hospital Secure Location (Security or Locker, etc.) cash/credit card;cell phone/electronics;clothing;keys;purse/wallet;shoes   Belongings Search Yes   Clothing Search Yes   Second Staff Crystal   Comment brown shirt, pair of brown boots, brown/yellow CAT Hat, 1x cigarette, red/black checkered jacket, vape pen, blue jeans, green shirt, lighter, envelope.   List items sent to safe: [place] Phone w/ cracked screen, 14x keys on ring, hunting licence, DCI Card, 2x [employer] card, MISC Cards, 3mill ID, blacksmith card, noa's rewards card, red cross card, 5x state farm insurance cards, Social Security Card, Visa Debit, black wallet.    All other belongings put in assigned cubby in belongings room.       I have reviewed my belongings list on admission and verify that it is correct.     Patient signature_______________________________    Second staff witness (if patient unable to sign) ______________________________       I have received all my belongings at discharge.    Patient signature________________________________    Jesús   10/6/2022  9:39 PM

## 2022-10-07 NOTE — H&P
"[facility] PSYCHIATRY   HISTORY AND PHYSICAL     ADMISSION DATA     Bandar Lawson MRN# [id number]   Age: 47 year old YOB: 1975     Date of Admission: 10/6/2022  Primary Physician: No Ref-Primary, Physician        CHIEF COMPLAINT   \"You have to help me-people are after me again\"       HISTORY OF PRESENT ILLNESS          Bandar Lawson is a 46 yo male who presents to the ED at [facility] with worsening symptoms of of paranoia and delusional thinking. Pt was discharged from this unit on 9.21.22 at which  Time he was stable, no delusions or paranoia. He was seen in OP clinic 10 days ago at which time he was doing very well. He was instructed to continue on the oral risperdal until he returned to the clinic for his next Invega injection on Oct 24. We had also discussed possibly using the risperdal PRN during high stress times but would further address at his follow up. Pt was staying at his family's cabin near [place] which is across the road from his aunt who is a LICSW. 5 days after his visit his aunt noticed Bandar's behavior was paranoid, she asked if he was taking his medications and he told her he hadn't taken them for the past 4 days. Risperdal 1 mg BID was resumed with minimal improvement therefore family made the decision to transport pt to the ED here at [facility].         Today Bandar reports feeling safe now that he is here on the unit. He is fearful to leave because of the people chasing him. He does not know who they are. He repeatedly asks me if it is true they are following him. He readily accepts the explanation they are not following him and this is a delusion. He asks me to increase his risperdal to \"make it stop\".       PSYCHIATRIC HISTORY       This is patients 3rd hospitalization- all here at [facility], each for paranoia and delusions. Pt functions very well when taking medications correctly, he works full time as a  programming new parts.       SUBSTANCE USE " HISTORY   History   Drug Use No   None    History   Smoking Status     Current Every Day Smoker     Packs/day: 1.50     Years: 25.00     Types: Cigarettes, Vaping Device   Smokeless Tobacco     Never Used    Has on patch and using gum.       SOCIAL HISTORY   Social History     Socioeconomic History     Marital status: Single     Spouse name: N/A     Number of children: 0     Years of education: 14. HS + 2 year vocational training     Highest education level: 2 year AA degree   Occupational History     Works as a     Tobacco Use     Smoking status: Current Every Day Smoker     Packs/day: 1.50     Years: 25.00     Pack years: 37.50     Types: Cigarettes, Vaping Device     Smokeless tobacco: Never Used   Substance and Sexual Activity     Alcohol use: No     Drug use: No     Sexual activity: No   Other Topics Concern     Parent/sibling w/ CABG, MI or angioplasty before 65F 55M? No   Social History Narrative     Not on file     Social Determinants of Health     Financial Resource Strain: Not on file   Food Insecurity: Not on file   Transportation Needs: Not on file   Physical Activity: Not on file   Stress: Not on file   Social Connections: Not on file   Intimate Partner Violence: Not on file   Housing Stability: Not on file            FAMILY HISTORY   Family History   Problem Relation Age of Onset     Diabetes Brother          PAST MEDICAL HISTORY   History reviewed. No pertinent past medical history.    History reviewed. No pertinent surgical history.    Patient has no known allergies.     MEDICATIONS   Prior to Admission medications    Medication Sig Start Date End Date Taking? Authorizing Provider   aspirin (ASA) 81 MG chewable tablet Take 1 tablet (81 mg) by mouth daily 9/22/22  Yes Marquise Vaughan, DO   empagliflozin (JARDIANCE) 25 MG TABS tablet Take 25 mg by mouth daily (Patient crushes med to take.) 9/2/21  Yes Reported, Patient   gemfibrozil (LOPID) 600 MG tablet Take 1 tablet (600 mg) by mouth  daily 9/21/22  Yes Marquise Vaughan,    liraglutide (VICTOZA) 18 MG/3ML solution Inject 1.8 mg Subcutaneous daily 8/17/22  Yes Reported, Patient   metFORMIN (GLUCOPHAGE) 500 MG tablet Take 1 tablet (500 mg) by mouth 2 times daily (with meals) 9/21/22  Yes Marquise Vaughan DO   paliperidone (INVEGA SUSTENNA) 156 MG/ML ALIDA injection Inject 156 mg into the muscle every 28 days Last given 9/14/2022 9/14/22  Yes Reported, Patient   risperiDONE (RISPERDAL M-TABS) 1 MG ODT Place 1 tablet (1 mg) under the tongue 2 times daily 9/21/22  Yes Marquise Vaughan, DO   B-D U/F 31G X 8 MM insulin pen needle Use as directed with lantus pen. 9/12/22   Reported, Patient   ONETOUCH ULTRA test strip Use as directed to test blood glucose up to twice daily. 8/2/22   Reported, Patient   risperiDONE (RISPERDAL) 1 MG tablet Take 1 tablet (1 mg) by mouth 2 times daily as needed (anxiety, psychosis (may crush if necessary, mix powder in 1 teaspon of soft food)) 9/26/22   Raj Rangel MD        PHYSICAL EXAM/ROS     I have reviewed the physical exam as documented by the medical team and agree with findings and assessment and have no additional findings to add at this time. The review of systems is negative other than noted in the HPI.       LABS   Recent Results (from the past 24 hour(s))   Basic metabolic panel    Collection Time: 10/06/22  1:44 PM   Result Value Ref Range    Sodium 137 133 - 144 mmol/L    Potassium 4.3 3.4 - 5.3 mmol/L    Chloride 109 94 - 109 mmol/L    Carbon Dioxide (CO2) 23 20 - 32 mmol/L    Anion Gap 5 3 - 14 mmol/L    Urea Nitrogen 19 7 - 30 mg/dL    Creatinine 0.80 0.66 - 1.25 mg/dL    Calcium 9.8 8.5 - 10.1 mg/dL    Glucose 90 70 - 99 mg/dL    GFR Estimate >90 >60 mL/min/1.73m2   CBC with platelets and differential    Collection Time: 10/06/22  1:44 PM   Result Value Ref Range    WBC Count 9.9 4.0 - 11.0 10e3/uL    RBC Count 5.23 4.40 - 5.90 10e6/uL    Hemoglobin 15.4 13.3 - 17.7 g/dL    Hematocrit 46.5  "40.0 - 53.0 %    MCV 89 78 - 100 fL    MCH 29.4 26.5 - 33.0 pg    MCHC 33.1 31.5 - 36.5 g/dL    RDW 14.1 10.0 - 15.0 %    Platelet Count 306 150 - 450 10e3/uL    % Neutrophils 57 %    % Lymphocytes 35 %    % Monocytes 6 %    % Eosinophils 1 %    % Basophils 1 %    % Immature Granulocytes 0 %    NRBCs per 100 WBC 0 <1 /100    Absolute Neutrophils 5.8 1.6 - 8.3 10e3/uL    Absolute Lymphocytes 3.4 0.8 - 5.3 10e3/uL    Absolute Monocytes 0.6 0.0 - 1.3 10e3/uL    Absolute Eosinophils 0.1 0.0 - 0.7 10e3/uL    Absolute Basophils 0.1 0.0 - 0.2 10e3/uL    Absolute Immature Granulocytes 0.0 <=0.4 10e3/uL    Absolute NRBCs 0.0 10e3/uL   Extra Blue Top Tube    Collection Time: 10/06/22  1:48 PM   Result Value Ref Range    Hold Specimen JIC    Extra Red Top Tube    Collection Time: 10/06/22  1:48 PM   Result Value Ref Range    Hold Specimen JIC    Extra Green Top (Lithium Heparin) Tube    Collection Time: 10/06/22  1:48 PM   Result Value Ref Range    Hold Specimen JIC    Glucose by meter    Collection Time: 10/06/22  8:07 PM   Result Value Ref Range    GLUCOSE BY METER POCT 122 (H) 70 - 99 mg/dL   Glucose by meter    Collection Time: 10/07/22  8:28 AM   Result Value Ref Range    GLUCOSE BY METER POCT 94 70 - 99 mg/dL         MENTAL STATUS EXAM   Vitals: /74 (BP Location: Right arm)   Pulse 90   Temp 97.4  F (36.3  C) (Temporal)   Resp 16   Ht 1.854 m (6' 1\")   SpO2 98%   BMI 34.75 kg/m      Appearance:  awake, alert and dressed in hospital scrubs  Attitude:  cooperative  Eye Contact:  good  Mood:  anxious and sad   Affect:  intensity is heightened  Speech:  clear, coherent  Psychomotor Behavior:  no evidence of tardive dyskinesia, dystonia, or tics  Thought Process:  tangental  Associations:  loosening of associations present  Thought Content:  patient appears to be responding to internal stimuli and paraoid/delusions  Insight:  limited  Judgment:  limited  Oriented to:  time, person, and place  Attention Span and " Concentration:  poor  Recent and Remote Memory:  limited  Language: Able to name objects and Able to repeat phrases  Fund of Knowledge: appropriate  Muscle Strength and Tone: normal  Gait and Station: Normal       ASSESSMENT     This is a 47 year old male with a PMH of schizoaffective disorder bipolar type and ASD admitted to the unit following his discharge on 9.21.22. Pt was doing well following discharge until he stopped taking his Risperdal for 3 days. When he family found out and he resumed the medication his initial symptoms improved however he remained paranoid and delusional fearful of others chasing him. He is glad to be back on the unit where he feels safe.       DIAGNOSIS     Schizoaffective disorder, bipolar type         PLAN     Location: Unit 5  Legal Status: Orders Placed This Encounter      Voluntary    Safety Assessment:    Behavioral Orders   Procedures     Code 1 - Restrict to Unit     Routine Programming     As clinically indicated     Status 15     Every 15 minutes.      PTA medications held:   none    PTA medications continued/changed:   Risperdal 1 mg BID -> increased to 1 mg in the am and 2 at hs.  Lopid 600 mg daily  Victoza 1.8 mg daily  Glucophage 500 mg BID with meals  Jardiance 25 mg daily    New medications initiated:   none    Programming: Patient will be treated in a therapeutic milieu with appropriate individual and group therapies. Education will be provided on diagnoses, medications, and treatments.     Medical diagnoses:  Per medicine    Consult: n/a  Tests: n/a    Anticipated LOS: 3-5 days  Disposition: home - possibly with parents     ATTESTATION      MAYNOR Strong CNP

## 2022-10-07 NOTE — PLAN OF CARE
"  Problem: Plan of Care - These are the overarching goals to be used throughout the patient stay.    Goal: Patient-Specific Goal (Individualized)  Description: You can add care plan individualizations to a care plan. Examples of Individualization might be:  \"Parent requests to be called daily at 9am for status\", \"I have a hard time hearing out of my right ear\", or \"Do not touch me to wake me up as it startles me\".  Outcome: Ongoing, Progressing     Problem: Suicidal Behavior  Goal: Suicidal Behavior is Absent or Managed  Outcome: Ongoing, Progressing     Problem: Thought Process Alteration  Goal: Optimal Thought Clarity  Description: PT will have clear thoughts before discharge  PT will be compliant with medications  Outcome: Ongoing, Progressing   Goal Outcome Evaluation:    Plan of Care Reviewed With: patient      Pt. Has been in bed most of shift, is cooperative with taking most of prescribed medications, refusing to take some of his medications for his diabetes due to blood glucose \"is kind of low for me to take them\", denies suicidal ideation, states \"I just feel scared, and I don't want to come out of my house.  I just need more medication\",  [facility], appetite is good, will continue to monitor progress.    Face to face end of shift report will be communicated to oncoming afternoon shift RN.     Jen Elliott RN  10/7/2022  1:24 PM                 "

## 2022-10-07 NOTE — PLAN OF CARE
"ADMISSION NOTE    Reason for admission paranoia.  Safety concerns yes pt state he doesn't feel safe and has a history of having Suicidal thoughts.  Risk for or history of violence no known history.   Full skin assessment: completed and no known issues.    Patient arrived on unit from our ER accompanied by flores RN on 10/6/2022  3:43 AM.   Status on arrival: calm and cooperative.  Pt states after he discharged from here that he did not take his psyc medications regularly.  States he does not feel that he is on the right medication combinations and would like his medications adjusted.  States he feels paranoids.  States he can't work and doesn't trust his mother.  Admits to high anxiety and depression.  States he isn't suicidal right now.  Denies HI, pain, and hallucinations. Pt refused his blood sugar check at 1700.  He states he only checks his blood sugars at 8am and 8pm.  Pt's blood sugar at  was 122.  Pt has been isolative to his room.  Makes needs known.      2300- Writer is continuing care for pt through night shift. Pt appears to be sleeping in his bed. Pt appeared to sleep 8 hours through the night with a normal breathing pattern.  Pt makes needs known.       /71 (BP Location: Right arm)   Pulse 82   Temp 98.2  F (36.8  C) (Temporal)   Resp 18   Ht 1.854 m (6' 1\")   SpO2 98%   BMI 34.75 kg/m    Patient given tour of unit and Welcome to  unit papers given to patient, wanding completed, belongings inventoried.   Patient's legal status on arrival is voluntary. Appropriate legal rights discussed with and copy given to patient. Patient Bill of Rights discussed with and copy given to patient.   Patient denies SI, HI, and thoughts of self harm and contracts for safety while on unit.      Ruba Hampton RN  10/6/2022            Problem: Behavioral Health Plan of Care  Goal: Patient-Specific Goal (Individualization)  Description: Patient will sleep 6 to 8 hours per night  Patient will eat at " least 50% of meals  Patient will attend at least 50% of groups  Patient will comply with recommendations of treatment team  Patient will remain medication compliant  Outcome: Ongoing, Progressing    Problem: Thought Process Alteration  Goal: Optimal Thought Clarity  Description: PT will have clear thoughts before discharge  PT will be compliant with medications  10/6/2022 2204 by Ruba Hampton, RN  Outcome: Ongoing, Progressing     Face to face end of shift report communicated to flores bishop RN.     Ruba Hampton, JULIO CESAR  10/7/2022

## 2022-10-07 NOTE — PROGRESS NOTES
[facility]    Medical Services Progress Note    Date of Service (when I saw the patient): 10/07/2022    Assessment & Plan     Principal Problem:    Paranoia (H)     Active Medical Problem:  Type 2 diabetes on insulin- last A1c 8/12 was 7.4. home dose of jardiance, Metformin, victoza, lantus 70 units at bedtime ordered. Consistent carb diet, Accuchecks qid, bedtime, and 0200 with sliding scale ordered. Pt states he is not going to allow but 2 accuchecks a day as this is what he does at home. Explained that it would remain ordered with sliding scale and he can refuse them, but did encourage him to allow while here.   10/7- he is refusing some of the accuchecks as he states he only checks it 2 times daily at home. Blood sugars are stable. Continue to encourage pt to allow blood glucose monitoring.      ED course- influenza, RSV, Covid negative, UA shows no infection, CBC and BMP wnl.     Pt medically stable, no acute medical concerns. Chronic medical problems stable. Will sign off. Please consult for any new medical issues or concerns.       Code Status: Full Code.    Paola Boo CNP      -Data reviewed today: I reviewed all new labs and imaging results over the last 24 hours.     Physical Exam   Temp: 97.4  F (36.3  C) Temp src: Temporal BP: 121/74 Pulse: 90   Resp: 16 SpO2: 98 % O2 Device: None (Room air)    There were no vitals filed for this visit.  Vital Signs with Ranges  Temp:  [97.3  F (36.3  C)-98.2  F (36.8  C)] 97.4  F (36.3  C)  Pulse:  [82-90] 90  Resp:  [16-18] 16  BP: (110-134)/(64-82) 121/74  SpO2:  [96 %-98 %] 98 %  No intake/output data recorded.    Constitutional: awake, alert, cooperative, no apparent distress, and appears stated age, vitals stable   Respiratory: No increased work of breathing, good air exchange, clear to auscultation bilaterally, no crackles or wheezing  Cardiovascular: Normal apical impulse, regular rate and rhythm, normal S1 and S2, no S3 or S4, and no murmur  noted  Neurologic: Awake, alert, oriented to name, place and time.   Neuropsychiatric: General: normal, calm and fair eye contact    Medications     aspirin  81 mg Oral Daily     empagliflozin  25 mg Oral Daily     gemfibrozil  600 mg Oral Daily     insulin aspart  1-7 Units Subcutaneous TID AC     insulin aspart  1-5 Units Subcutaneous At Bedtime     insulin glargine  70 Units Subcutaneous At Bedtime     liraglutide  1.8 mg Subcutaneous Daily     metFORMIN  500 mg Oral BID w/meals     nicotine  1 patch Transdermal Daily     nicotine   Transdermal Q8H     risperiDONE  1 mg Sublingual BID       Data   Recent Labs   Lab 10/07/22  0828 10/06/22  2007 10/06/22  1344   WBC  --   --  9.9   HGB  --   --  15.4   MCV  --   --  89   PLT  --   --  306   NA  --   --  137   POTASSIUM  --   --  4.3   CHLORIDE  --   --  109   CO2  --   --  23   BUN  --   --  19   CR  --   --  0.80   ANIONGAP  --   --  5   HENNA  --   --  9.8   GLC 94 122* 90       No results found for this or any previous visit (from the past 24 hour(s)).

## 2022-10-07 NOTE — PROGRESS NOTES
"    Social Service Psychosocial Assessment     Presenting Problem:     The patient is a 47 year old male that was admitted for paranoia. Patient shared that when he discharged from this unit on 9/21/2022 he did not take his BH medications regularly.       Patient was admitted to this unit  9/16/2022: Per H&P \"Pt presented to the emergency department accompanied by parents with complaint of crisis related to his schizoaffective disorder bipolar type. Parents report the patient has had increased problems over the last several months with worsening paranoia and delusional thinking. Patient has had suicidal ideations and made comments about shooting himself.\"    Patient then discharged this unit: 9/21/2022 - And had the following follow up:     [facility]  Therapy intake  Address: [address] [address]  Phone: [phone]     [facility]   [employer] - Joyce Nick:  [address] [place] [address]  Phone: [phone]  Fax: [phone]         Marital Status: Single     Spouse / Children: None     Psychiatric TX HX: Pt was last on our unit 03/10/17.     Suicide Risk Assessment:   The patient denies SI for this admit. Has a hx of SI.   9/16/2022 admission to this unit :On admission pt reported having SI, pt reported to staff that he plans to shoot himself when he discharges. Pt denies any prior suicide attempts. Pt denies SI today.      Access to Lethal Means (explain):     Pt denies access to guns.      Family Psych HX:  Sister has anxiety disorder and did not leave her apartment for 4 or 5 months.      A & Ox: x4     Medication Adherence:     Unknown please refer to H&PSee H&P     Medical Issues: Unknown please refer to See H&P     Visual -Motor Functioning:     Good. No issues at this time      Communication Skills /Needs:     Pt presents with pressured, disorganized, tangential speech.     Ethnicity: White                   Spirituality/Methodist " Affiliation: Ash               Clergy Request: Yes      History:     None reported     Living Situation:     Pt is living with his sister in [place].      ADL s:     Independent     Education:     Graduated high school. Completed  program at a community college.     Financial Situation:     Is employed.     Occupation:     Employed full time at [employer] working nights.     Leisure & Recreation:   Hunting and fishing.     Childhood History:  Pt grew up in [place]. Has 1 older sister.      Trauma Abuse HX:   Pt denies any childhood abuse.     Relationship / Sexuality:   Pt denies any current relationship.     Substance Use/ Abuse: No utox available. Pt has a history of alcohol abuse. Pt denies any recent substance abuse.     Chemical Dependency Treatment HX: Pt completed CD treatment in 2006 and 2009 following a DUI.     Legal Issues: History of a DUI conviction. Denies any current legal issues.     Significant Life Events: Was inpatient before      Strengths: Good family support system, Is in a safe place, has a place to live, has services.      Challenges /Limitation: Mental health symptoms: paranoia and delusions., Did not follow through taking his medications.      Patient Support Contact (Include name, relationship, number, and summary of conversation):   NILDA signed for his mom Milady Lawson [phone]      Interventions:          Medical/Dental Care: PCP: Raymond hollingsworth @ [facility]       Medication Management: Joyce Nick       Therapy: Patient requested to have Therapy at  [address], [address]  [place] [address]  [phone]       Insurance Coverage: PREFERREDONE       Commit/Llamas Screening: NA       Suicide Risk Assessment: The patient denies SI  for this admit. Has a hx of SI.   9/16/2022 admission to this unit :On admission pt reported having SI, pt reported to staff that he plans to shoot himself when he discharges. Pt denies any prior suicide attempts.  Pt denies SI today.        High Risk Safety Plan: Talk to supports; Call crisis lines; Go to local ER if feeling suicidal.

## 2022-10-08 LAB
GLUCOSE BLDC GLUCOMTR-MCNC: 162 MG/DL (ref 70–99)
GLUCOSE BLDC GLUCOMTR-MCNC: 72 MG/DL (ref 70–99)

## 2022-10-08 PROCEDURE — 250N000013 HC RX MED GY IP 250 OP 250 PS 637: Performed by: PSYCHIATRY & NEUROLOGY

## 2022-10-08 PROCEDURE — 250N000013 HC RX MED GY IP 250 OP 250 PS 637: Performed by: NURSE PRACTITIONER

## 2022-10-08 PROCEDURE — 124N000001 HC R&B MH

## 2022-10-08 PROCEDURE — 99232 SBSQ HOSP IP/OBS MODERATE 35: CPT | Performed by: NURSE PRACTITIONER

## 2022-10-08 RX ORDER — RISPERIDONE 0.5 MG/1
1 TABLET, ORALLY DISINTEGRATING ORAL DAILY PRN
Status: DISCONTINUED | OUTPATIENT
Start: 2022-10-08 | End: 2022-10-08

## 2022-10-08 RX ORDER — RISPERIDONE 2 MG/1
2 TABLET, ORALLY DISINTEGRATING ORAL 2 TIMES DAILY
Status: DISCONTINUED | OUTPATIENT
Start: 2022-10-08 | End: 2022-10-11 | Stop reason: HOSPADM

## 2022-10-08 RX ADMIN — NICOTINE POLACRILEX 2 MG: 2 GUM, CHEWING BUCCAL at 20:08

## 2022-10-08 RX ADMIN — RISPERIDONE 1 MG: 0.5 TABLET, ORALLY DISINTEGRATING ORAL at 08:12

## 2022-10-08 RX ADMIN — NICOTINE POLACRILEX 2 MG: 2 GUM, CHEWING BUCCAL at 13:22

## 2022-10-08 RX ADMIN — NICOTINE POLACRILEX 2 MG: 2 GUM, CHEWING BUCCAL at 07:58

## 2022-10-08 RX ADMIN — NICOTINE 1 PATCH: 21 PATCH, EXTENDED RELEASE TRANSDERMAL at 08:14

## 2022-10-08 RX ADMIN — ASPIRIN 81 MG 81 MG: 81 TABLET ORAL at 08:12

## 2022-10-08 RX ADMIN — RISPERIDONE 2 MG: 2 TABLET, ORALLY DISINTEGRATING ORAL at 20:24

## 2022-10-08 RX ADMIN — RISPERIDONE 1 MG: 0.5 TABLET, ORALLY DISINTEGRATING ORAL at 11:39

## 2022-10-08 RX ADMIN — GEMFIBROZIL 600 MG: 600 TABLET ORAL at 08:12

## 2022-10-08 RX ADMIN — INSULIN GLARGINE 70 UNITS: 100 INJECTION, SOLUTION SUBCUTANEOUS at 20:24

## 2022-10-08 ASSESSMENT — ACTIVITIES OF DAILY LIVING (ADL)
ADLS_ACUITY_SCORE: 39

## 2022-10-08 NOTE — PLAN OF CARE
"Face to face shift report received from JULIO CESAR Driver.       Problem: Behavioral Health Plan of Care  Goal: Patient-Specific Goal (Individualization)  Description: Patient will sleep 6 to 8 hours per night  Patient will eat at least 50% of meals  Patient will attend at least 50% of groups  Patient will comply with recommendations of treatment team  Patient will remain medication compliant  Outcome: Ongoing, Not Progressing   Cooperative. Appears anxious. Reports improvement to paranoia. Requested and received Risperdal 1mg ODT PRN at 1139. Flat affect. Withdrawn to room majority of shift, but does come to [facility] for meals and telephone. No reports of pain.   Pt refused noon accucheck, stating he only does it twice a day.   AM Accucheck 72.  Pt refused AM Metformin, Victoza, and Jardiance due to \"I don't take them when my blood sugar is that low.\"   Problem: Suicidal Behavior  Goal: Suicidal Behavior is Absent or Managed  Outcome: Ongoing, Progressing   Free from self harm or injury this shift.     Problem: Thought Process Alteration  Goal: Optimal Thought Clarity  Description: PT will have clear thoughts before discharge  PT will be compliant with medications  Outcome: Ongoing, Progressing       Face to face end of shift report to be communicated to oncoming RN.       "

## 2022-10-08 NOTE — PLAN OF CARE
Problem: Behavioral Health Plan of Care  Goal: Patient-Specific Goal (Individualization)  Description: Patient will sleep 6 to 8 hours per night  Patient will eat at least 50% of meals  Patient will attend at least 50% of groups  Patient will comply with recommendations of treatment team  Patient will remain medication compliant  Outcome: Ongoing, Progressing     Problem: Suicidal Behavior  Goal: Suicidal Behavior is Absent or Managed  Outcome: Ongoing, Progressing     Problem: Thought Process Alteration  Goal: Optimal Thought Clarity  Description: PT will have clear thoughts before discharge  PT will be compliant with medications  Outcome: Ongoing, Progressing     Face to face shift report received from JULIO CESAR Dumont. Rounding completed, pt observed laying in bed, appeared to be sleeping.    Patient appeared to be sleeping for approximately 9.25 hours since 2045 last shift.    Patient had no reported or observed suicidal behavior or self harm this shift.    Patient not woken for 0200 BG per his request, as well as not checking in AM BG until 0800.    Face to face report will be communicated to oncnatan HARRELL.    Neisha Nick RN  10/8/2022  6:29 AM

## 2022-10-08 NOTE — PROGRESS NOTES
"[facility] PSYCHIATRY  PROGRESS NOTE     SUBJECTIVE        Met with patient in his room this am where he was resting. He reports \"feeling less paranoid\" this am and wonders if his am risperdal could be increased. Since his PM dose was increased I offered to keep the am dose at 1 mg and have a PRN dose of Risperdal 1 mg which he may request if his paranoia were to increase, he could request this once daily if needed. Bandar felt this was a good plan. He has been spending some time in his room reading his bible which calms him.  Bandar was pleased with his sleep last night as he has struggled with sleep over the past several days. He asked if he could go home Monday, I reassured him he will get to go home however it is too early to make this decision. He is better off to spend an extra day or 2 to ensure that he is well stabilized and does not have to return. Given his response to the medication I do not anticipate him requiring an extended stay.         Denies feeling depressed, \"some anxiety when I get paranoid\", no delusions this am, no SII/HI. Tolerating the increase in Risperdal w/o any side effects. Appetite is good. Pt did attend group once yesterday which he reports was nice as there were only 2 people in the group giving them more attention.       MEDICATIONS   Scheduled Meds:    aspirin  81 mg Oral Daily     empagliflozin  25 mg Oral Daily     gemfibrozil  600 mg Oral Daily     insulin aspart  1-7 Units Subcutaneous TID AC     insulin aspart  1-5 Units Subcutaneous At Bedtime     insulin glargine  70 Units Subcutaneous At Bedtime     liraglutide  1.8 mg Subcutaneous Daily     metFORMIN  500 mg Oral BID w/meals     nicotine  1 patch Transdermal Daily     nicotine   Transdermal Q8H     risperiDONE  1 mg Sublingual Daily     risperiDONE  2 mg Sublingual At Bedtime     PRN Meds:.acetaminophen, glucose **OR** dextrose **OR** glucagon, hydrOXYzine, nicotine, OLANZapine **OR** OLANZapine     ALLERGIES   No Known " "Allergies     MENTAL STATUS EXAM   Vitals: /83 (BP Location: Right arm)   Pulse 91   Temp (!) 96.7  F (35.9  C) (Skin)   Resp 16   Ht 1.854 m (6' 1\")   SpO2 97%   BMI 34.75 kg/m      Appearance:  awake, alert, adequately groomed and dressed in hospital scrubs  Attitude:  cooperative  Eye Contact:  good  Mood:  better, remains slightly anxious with concerns his delusions may return  Affect:  intensity is blunted  Speech:  clear, coherent  Psychomotor Behavior:  no evidence of tardive dyskinesia, dystonia, or tics  Thought Process:  logical and goal oriented  Associations:  no loose associations  Thought Content:  no evidence of suicidal ideation or homicidal ideation and no evidence of psychotic thought  Insight:  limited  Judgment:  limited  Oriented to:  time, person, and place  Attention Span and Concentration:  fair  Recent and Remote Memory:  limited  Language: Able to name objects, Able to repeat phrases and Able to read and write  Fund of Knowledge: appropriate  Muscle Strength and Tone: normal  Gait and Station: Normal       LABS   Recent Results (from the past 24 hour(s))   Glucose by meter    Collection Time: 10/07/22  8:17 PM   Result Value Ref Range    GLUCOSE BY METER POCT 110 (H) 70 - 99 mg/dL   Glucose by meter    Collection Time: 10/08/22  8:00 AM   Result Value Ref Range    GLUCOSE BY METER POCT 72 70 - 99 mg/dL         IMPRESSION   This is a 47 year old male with a PMH of schizoaffective disorder bipolar type and ASD admitted to the unit following his discharge on 9.21.22. Pt was doing well following discharge until he stopped taking his Risperdal for 3 days. He became paranoid, fearful of people chasing him and did not feel safe at home. He went to his aunt who lives across the street, he admitted to not taking his medication and resumed the prescribed Risperdal 1 mg BID under supervision from his aunt. Initially his symptoms improved however he remained paranoid and delusional, fearful " of others chasing him. He is glad to be back on the unit where he feels safe. Is taking medications.         DIAGNOSES   Schizoaffective disorder, bipolar type       PLAN     Location: Unit 5  Legal Status: Orders Placed This Encounter      Voluntary    Safety Assessment:    Behavioral Orders   Procedures     Code 1 - Restrict to Unit     Routine Programming     As clinically indicated     Status 15     Every 15 minutes.      PTA medications continued/changed:   Risperdal 1 mg BID -> increased to 1 mg in the am and 2 at hs.   Lopid 600 mg daily  Victoza 1.8 mg daily  Glucophage 500 mg BID with meals  Jardiance 25 mg daily      New medications tried and stopped:     -None    New medications initiated:   none    Today's Changes:  Risperdal 1 mg PRN once daily if needed for increased delusions and paranoia    Programming: Patient will be treated in a therapeutic milieu with appropriate individual and group therapies. Education will be provided on diagnoses, medications, and treatments.     Medical diagnoses:  Per medicine    Consult: None  Tests: None    Anticipated LOS: 2-4 days  Disposition: home with parents       TREATMENT TEAM CARE PLAN     Progress: Continued symptoms.    Continued Stay Criteria/Rationale: Continued symptoms without sufficient improvement/resolution.    Medical/Physical: See above.    Precautions: See above.     Plan: Continue inpatient care with unit support and medication management.    Rationale for change in precautions or plan: NA due to no change.    Participants: MAYNOR Strong CNP, Nursing, SW, OT.    The patient's care was discussed with the treatment team and chart notes were reviewed.       ATTESTATION      MAYNOR Strong CNP

## 2022-10-08 NOTE — PLAN OF CARE
Problem: Thought Process Alteration  Goal: Optimal Thought Clarity  Description: PT will have clear thoughts before discharge  PT will be compliant with medications  Outcome: Ongoing, Progressing    Pt reports that his voices have improved some      Problem: Suicidal Behavior  Goal: Suicidal Behavior is Absent or Managed  Outcome: Ongoing, Progressing    Pt denies SI     Problem: Behavioral Health Plan of Care  Goal: Patient-Specific Goal (Individualization)  Description: Patient will sleep 6 to 8 hours per night  Patient will eat at least 50% of meals  Patient will attend at least 50% of groups  Patient will comply with recommendations of treatment team  Patient will remain medication compliant  Outcome: Ongoing, Progressing   Goal Outcome Evaluation:    Plan of Care Reviewed With: patient     1530 Face to face rounding complete.  Pt introduced to nursing for the shift.    Pt was up for the meal this evening and attended one of the groups. He told me that he had a great deal of anxiety about being around other people.  Pt asked for PRN Atarax and was given 25 mg and reported good benefits.  Pt was given some information on [facility] support groups and support numbers to contact if in a crisis.  He declined his Metformin this HS and declined the supper accucheck.  Pt's affect is significantly flat.  Pt is hoping to leave early next week so that he can get back to work.     2300 Face to face end of shift report communicated to Night shift RN's along with Pt's fall risk..     Tim Macdonald RN  10/7/2022  10:01 PM

## 2022-10-09 LAB
GLUCOSE BLDC GLUCOMTR-MCNC: 148 MG/DL (ref 70–99)
GLUCOSE BLDC GLUCOMTR-MCNC: 95 MG/DL (ref 70–99)

## 2022-10-09 PROCEDURE — 250N000013 HC RX MED GY IP 250 OP 250 PS 637: Performed by: PSYCHIATRY & NEUROLOGY

## 2022-10-09 PROCEDURE — 99232 SBSQ HOSP IP/OBS MODERATE 35: CPT | Performed by: NURSE PRACTITIONER

## 2022-10-09 PROCEDURE — 124N000001 HC R&B MH

## 2022-10-09 PROCEDURE — 250N000013 HC RX MED GY IP 250 OP 250 PS 637: Performed by: NURSE PRACTITIONER

## 2022-10-09 RX ADMIN — RISPERIDONE 2 MG: 2 TABLET, ORALLY DISINTEGRATING ORAL at 20:55

## 2022-10-09 RX ADMIN — NICOTINE 1 PATCH: 21 PATCH, EXTENDED RELEASE TRANSDERMAL at 08:03

## 2022-10-09 RX ADMIN — ASPIRIN 81 MG 81 MG: 81 TABLET ORAL at 08:03

## 2022-10-09 RX ADMIN — RISPERIDONE 2 MG: 2 TABLET, ORALLY DISINTEGRATING ORAL at 08:03

## 2022-10-09 RX ADMIN — NICOTINE POLACRILEX 2 MG: 2 GUM, CHEWING BUCCAL at 12:10

## 2022-10-09 RX ADMIN — GEMFIBROZIL 600 MG: 600 TABLET ORAL at 08:03

## 2022-10-09 RX ADMIN — INSULIN GLARGINE 70 UNITS: 100 INJECTION, SOLUTION SUBCUTANEOUS at 20:56

## 2022-10-09 RX ADMIN — NICOTINE POLACRILEX 2 MG: 2 GUM, CHEWING BUCCAL at 18:14

## 2022-10-09 ASSESSMENT — ACTIVITIES OF DAILY LIVING (ADL)
HYGIENE/GROOMING: INDEPENDENT
ADLS_ACUITY_SCORE: 39
LAUNDRY: UNABLE TO COMPLETE
DRESS: SCRUBS (BEHAVIORAL HEALTH);INDEPENDENT
ORAL_HYGIENE: INDEPENDENT
ADLS_ACUITY_SCORE: 39
ADLS_ACUITY_SCORE: 40
ADLS_ACUITY_SCORE: 39
ADLS_ACUITY_SCORE: 39
ADLS_ACUITY_SCORE: 40
ADLS_ACUITY_SCORE: 39
ADLS_ACUITY_SCORE: 39
DRESS: SCRUBS (BEHAVIORAL HEALTH)
ADLS_ACUITY_SCORE: 39
ADLS_ACUITY_SCORE: 39
ORAL_HYGIENE: INDEPENDENT
HYGIENE/GROOMING: INDEPENDENT
ADLS_ACUITY_SCORE: 39
ADLS_ACUITY_SCORE: 39

## 2022-10-09 NOTE — PROGRESS NOTES
"[facility] PSYCHIATRY  PROGRESS NOTE     SUBJECTIVE        Met with patient in his room this am where he was resting and reading his bible. He was thankful for the increase in the risperdal and is now taking 2 mg BID, he asks about increasing the Invega sustenna, I reminded him of how sedated he was at the higher dose and that continuing the risperdal may be the best option in addition to his current sustenna dose. This he readily accepted. He is anxious to discharge, I shared it is best to be stable so that he does not have to return-this he agreed with. He has concerns over returning to work \"it all started there\", he denies feeling embarrassed. \"It all has to do with the woman\", this is new information he is providing. Bandar denies this is the woman he was fixated on from the cafe where he was going each morning. He cannot give her a name only that she is the woman. He than asks if this is in his head \"like the people chasing me?\" I reassured him this was which he again accepted. \"Maybe I do need a few more days\". Pt has been isolating in his room, encouraged to get out of his room as much as possible. He becomes very anxious around others, this I told him needs to improve prior to going home.         Denies feeling depressed, \"some anxiety when I get stuck in my head\", delusions this am, no SII/HI. Tolerating the increase in Risperdal w/o any side effects. Appetite is good. Pt is planning on attending group later today.           I spoke with Milady, pts mother today provided her with an update. She expressed concerns over pt returning home too soon, this recent ep[isode was difficult for her and her . Reassurance provided.       MEDICATIONS   Scheduled Meds:    aspirin  81 mg Oral Daily     empagliflozin  25 mg Oral Daily     gemfibrozil  600 mg Oral Daily     insulin aspart  1-7 Units Subcutaneous TID AC     insulin aspart  1-5 Units Subcutaneous At Bedtime     insulin glargine  70 Units Subcutaneous " "At Bedtime     liraglutide  1.8 mg Subcutaneous Daily     metFORMIN  500 mg Oral BID w/meals     nicotine  1 patch Transdermal Daily     nicotine   Transdermal Q8H     risperiDONE  2 mg Sublingual BID     PRN Meds:.acetaminophen, glucose **OR** dextrose **OR** glucagon, hydrOXYzine, nicotine, OLANZapine **OR** OLANZapine     ALLERGIES   No Known Allergies     MENTAL STATUS EXAM   Vitals: /81 (BP Location: Right arm)   Pulse 81   Temp 97.5  F (36.4  C) (Temporal)   Resp 18   Ht 1.854 m (6' 1\")   Wt 119.5 kg (263 lb 8 oz)   SpO2 97%   BMI 34.76 kg/m      Appearance:  awake, alert, adequately groomed and dressed in hospital scrubs  Attitude:  cooperative  Eye Contact:  good  Mood:  better, remains slightly anxious with concerns his delusions may return  Affect:  intensity is blunted  Speech:  clear, coherent  Psychomotor Behavior:  no evidence of tardive dyskinesia, dystonia, or tics  Thought Process:  logical and goal oriented  Associations:  no loose associations  Thought Content:  no evidence of suicidal ideation or homicidal ideation and no evidence of psychotic thought  Insight:  limited  Judgment:  limited  Oriented to:  time, person, and place  Attention Span and Concentration:  fair  Recent and Remote Memory:  limited  Language: Able to name objects, Able to repeat phrases and Able to read and write  Fund of Knowledge: appropriate  Muscle Strength and Tone: normal  Gait and Station: Normal       LABS   Recent Results (from the past 24 hour(s))   Glucose by meter    Collection Time: 10/08/22  8:04 PM   Result Value Ref Range    GLUCOSE BY METER POCT 162 (H) 70 - 99 mg/dL   Glucose by meter    Collection Time: 10/09/22  7:58 AM   Result Value Ref Range    GLUCOSE BY METER POCT 95 70 - 99 mg/dL         IMPRESSION   This is a 47 year old male with a PMH of schizoaffective disorder bipolar type and ASD admitted to the unit following his discharge on 9.21.22. Pt was doing well following discharge until " he stopped taking his Risperdal for 3 days. He became paranoid, fearful of people chasing him and did not feel safe at home. He went to his aunt who lives across the street, he admitted to not taking his medication and resumed the prescribed Risperdal 1 mg BID under supervision from his aunt. Initially his symptoms improved however he remained paranoid and delusional, fearful of others chasing him. He is glad to be back on the unit where he feels safe. Is taking medications.         DIAGNOSES   Schizoaffective disorder, bipolar type       PLAN     Location: Unit 5  Legal Status: Orders Placed This Encounter      Voluntary    Safety Assessment:    Behavioral Orders   Procedures     Code 1 - Restrict to Unit     Routine Programming     As clinically indicated     Status 15     Every 15 minutes.      PTA medications continued/changed:   Risperdal 1 mg BID -> increased to 1 mg in the am and 2 at hs-> 2 mg BID 10.9.22  Lopid 600 mg daily  Victoza 1.8 mg daily  Glucophage 500 mg BID with meals  Jardiance 25 mg daily      New medications tried and stopped:     -None    New medications initiated:   none    Today's Changes:   Risperdal increased to 2 mg BID    Programming: Patient will be treated in a therapeutic milieu with appropriate individual and group therapies. Education will be provided on diagnoses, medications, and treatments.     Medical diagnoses:  Per medicine    Consult: None  Tests: None    Anticipated LOS: 2-4 days  Disposition: home with parents       TREATMENT TEAM CARE PLAN     Progress: Continued symptoms.    Continued Stay Criteria/Rationale: Continued symptoms without sufficient improvement/resolution.    Medical/Physical: See above.    Precautions: See above.     Plan: Continue inpatient care with unit support and medication management.    Rationale for change in precautions or plan: NA due to no change.    Participants: Joyce Nick, MAYNOR CNP, Nursing, SW, OT.    The patient's care was discussed  with the treatment team and chart notes were reviewed.       ATTESTATION      MAYNOR Strong CNP

## 2022-10-09 NOTE — PLAN OF CARE
Problem: Thought Process Alteration  Goal: Optimal Thought Clarity  Description: PT will have clear thoughts before discharge  PT will be compliant with medications  Outcome: Ongoing, Progressing    Pt denied hearing voices     Problem: Suicidal Behavior  Goal: Suicidal Behavior is Absent or Managed  Outcome: Ongoing, Progressing    Pt denies SI     Problem: Behavioral Health Plan of Care  Goal: Patient-Specific Goal (Individualization)  Description: Patient will sleep 6 to 8 hours per night  Patient will eat at least 50% of meals  Patient will attend at least 50% of groups  Patient will comply with recommendations of treatment team  Patient will remain medication compliant  Outcome: Ongoing, Progressing   Goal Outcome Evaluation:      1530 Face to face rounding complete.  PT introduced to nursing for the shift.      Pt was up and active this shift reading his bible in his room.  Pt discussed support groups with me and I did some searching on the Internet. I found a schizophrenia support group in [place] that he said he could go to.  We also found a [facility] group that is closer to where he lives.  Pt's affect was a bit brighter this evening and he talked with me about his work and family.  Pt told me that he is on Invega long acting that is due on the 14th of this month though this is not on his admission medication list.  He said that he wants to talk with Joyce Nick about getting the Invega injection dose increased.  Pt declined his supper time blood sugar check. He also declined his HS Metformin.  Pt reports that he is not hearing any voices now after the increase of Risperdal.    2200 Face to face end of shift report communicated to Night shift RN along with Pt's fall risk.     Tim Macdonald RN  10/8/2022  2200

## 2022-10-09 NOTE — PLAN OF CARE
Problem: Behavioral Health Plan of Care  Goal: Patient-Specific Goal (Individualization)  Description: Patient will sleep 6 to 8 hours per night  Patient will eat at least 50% of meals  Patient will attend at least 50% of groups  Patient will comply with recommendations of treatment team  Patient will remain medication compliant  Outcome: Ongoing, Progressing     Problem: Suicidal Behavior  Goal: Suicidal Behavior is Absent or Managed  Outcome: Ongoing, Progressing     Problem: Thought Process Alteration  Goal: Optimal Thought Clarity  Description: PT will have clear thoughts before discharge  PT will be compliant with medications  Outcome: Ongoing, Progressing     Face to face shift report received from JULIO CESAR Dumont. Rounding completed, pt observed laying in bed, appeared to be sleeping.    Patient appeared to be sleeping for approximately 7.75 hours since 2100 last shift.    Patient had no reported or observed suicidal behavior or self harm this shift.      0200 & AM accucheck not completed per patient request as he only wants BG checks at 8 & 8 per what he does at home.    Face to face report will be communicated to oncnatan RN.    Neisha Nick RN  10/9/2022  6:25 AM

## 2022-10-09 NOTE — PLAN OF CARE
Problem: Suicidal Behavior  Goal: Suicidal Behavior is Absent or Managed  Outcome: Progressing     Problem: Thought Process Alteration  Goal: Optimal Thought Clarity  Description: PT will have clear thoughts before discharge  PT will be compliant with medications  Outcome: Progressing     Problem: Behavioral Health Plan of Care  Goal: Patient-Specific Goal (Individualization)  Description: Patient will sleep 6 to 8 hours per night  Patient will eat at least 50% of meals  Patient will attend at least 50% of groups  Patient will comply with recommendations of treatment team  Patient will remain medication compliant  Outcome: Adequate for Care Transition   Goal Outcome Evaluation:    Plan of Care Reviewed With: patient          Pt. Was up and ate breakfast in dayroom, appetite is good, taking prescribed medications, cooperative with assessments, slept 7 hours last night, compliant with treatment team recommendations, is able to make needs be known, is delusional about a woman, would not elaborate on this, will continue to monitor progress.    Face to face end of shift report will be communicated to oncoming afternoon shift RN.     Jen Elliott RN  10/9/2022  10:24 AM

## 2022-10-10 LAB
GLUCOSE BLDC GLUCOMTR-MCNC: 71 MG/DL (ref 70–99)
GLUCOSE BLDC GLUCOMTR-MCNC: 85 MG/DL (ref 70–99)

## 2022-10-10 PROCEDURE — 250N000013 HC RX MED GY IP 250 OP 250 PS 637: Performed by: NURSE PRACTITIONER

## 2022-10-10 PROCEDURE — 124N000001 HC R&B MH

## 2022-10-10 PROCEDURE — 99232 SBSQ HOSP IP/OBS MODERATE 35: CPT | Performed by: NURSE PRACTITIONER

## 2022-10-10 PROCEDURE — 250N000009 HC RX 250: Performed by: PSYCHIATRY & NEUROLOGY

## 2022-10-10 PROCEDURE — 250N000013 HC RX MED GY IP 250 OP 250 PS 637: Performed by: PSYCHIATRY & NEUROLOGY

## 2022-10-10 RX ORDER — RISPERIDONE 1 MG/1
1 TABLET ORAL 2 TIMES DAILY
Status: ON HOLD | COMMUNITY
End: 2022-10-10

## 2022-10-10 RX ORDER — RISPERIDONE 2 MG/1
2 TABLET, ORALLY DISINTEGRATING ORAL 2 TIMES DAILY
Qty: 60 TABLET | Refills: 0 | Status: SHIPPED | OUTPATIENT
Start: 2022-10-11

## 2022-10-10 RX ORDER — LIRAGLUTIDE 6 MG/ML
1.8 INJECTION SUBCUTANEOUS DAILY
COMMUNITY

## 2022-10-10 RX ADMIN — NICOTINE POLACRILEX 2 MG: 2 GUM, CHEWING BUCCAL at 08:41

## 2022-10-10 RX ADMIN — ASPIRIN 81 MG 81 MG: 81 TABLET ORAL at 08:02

## 2022-10-10 RX ADMIN — GEMFIBROZIL 600 MG: 600 TABLET ORAL at 08:02

## 2022-10-10 RX ADMIN — RISPERIDONE 2 MG: 2 TABLET, ORALLY DISINTEGRATING ORAL at 21:05

## 2022-10-10 RX ADMIN — LIRAGLUTIDE 1.8 MG: 6 INJECTION SUBCUTANEOUS at 08:37

## 2022-10-10 RX ADMIN — NICOTINE 1 PATCH: 21 PATCH, EXTENDED RELEASE TRANSDERMAL at 08:02

## 2022-10-10 RX ADMIN — RISPERIDONE 2 MG: 2 TABLET, ORALLY DISINTEGRATING ORAL at 08:02

## 2022-10-10 RX ADMIN — METFORMIN HYDROCHLORIDE 500 MG: 500 TABLET, FILM COATED ORAL at 08:02

## 2022-10-10 RX ADMIN — EMPAGLIFLOZIN 25 MG: 25 TABLET, FILM COATED ORAL at 08:02

## 2022-10-10 ASSESSMENT — ACTIVITIES OF DAILY LIVING (ADL)
ADLS_ACUITY_SCORE: 40
DRESS: SCRUBS (BEHAVIORAL HEALTH);INDEPENDENT
ADLS_ACUITY_SCORE: 40
ORAL_HYGIENE: INDEPENDENT
ADLS_ACUITY_SCORE: 40
ADLS_ACUITY_SCORE: 40
LAUNDRY: UNABLE TO COMPLETE
ADLS_ACUITY_SCORE: 40
HYGIENE/GROOMING: INDEPENDENT
ADLS_ACUITY_SCORE: 40

## 2022-10-10 NOTE — PLAN OF CARE
"  Problem: Adult Behavioral Health Plan of Care  Goal: Patient-Specific Goal (Individualization)  Description: You can add care plan individualizations to a care plan. Examples of Individualization might be:  \"Parent requests to be called daily at 9am for status\", \"I have a hard time hearing out of my right ear\", or \"Do not touch me to wake me up as it startles me\".  Outcome: Progressing     Pt in unge at the start of the shift. Pt denies pain, depression, SI, HI and hallucinations. Pt does report anxiety. Pt reports anxiety but states he feels it is under control while he is here. Pt states that he needs to start slow and only go to small stores or places where there will be few people. Pt states it starts when he goes to [employer] or [employer]. Pt states his dad called him and said he would go to [employer] with him when he comes home. Pt states that his dad does not understand how anxiety works and thinks if he can just face his fear he will get better.   Pt's mom called and asked about his discharge plans. Pt called his mom and told her to pick him up at 1pm, but nursing could not find a note with discharge plan. Pt's mom was worried about his risperidone. She is requesting that a few pills be sent home with him in case they can't get the ODT pills like last time.     Pt accuchek done after he ate his snack. Pt blood sugar was 85. Pt refused metformin and Lantus.  Pt did take his risperidone 2mg.   Problem: Thought Process Alteration  Goal: Optimal Thought Clarity  Description: PT will have clear thoughts before discharge  PT will be compliant with medications  Outcome: Progressing     Problem: Suicidal Behavior  Goal: Suicidal Behavior is Absent or Managed  Outcome: Progressing   Goal Outcome Evaluation:         Face to face end of shift report communicated to night shift RN.     Tresa Zamorano RN  10/10/2022  3:47 PM                      "

## 2022-10-10 NOTE — CARE PLAN
Prior to Admission Medication Reconciliation:     Medications added:   [] None  [x] As listed below:    risperdal 1 mg BID- pt's psych provider cont pt on dose, was not ODT, pt was able to swallow    lantus- removed at admission, pt does take PTA. Unsure why it was removed.     Medications deleted:   [x] None  [] As listed below:    Medications marked for review/removal by attending:  [] None  [x] As listed below:     victoza- pt expressed that he wanted to be stopped on victoza- reports that he thinks he is managing his weight just fine and that it is an expensive med. Reports he doesn't always take it. Reports that it was prescribed to help him lose weight and not for his diabetes.     Changes made to existing medications:   [x] None  [] Updated strengths and frequencies to most current.   [] As listed below:    Pertinent notes/medications patient takes different than prescribed:     Pt won't take lantus or metformin if his BG is below or around 100.     Crushes gemfibrozil, metformin and jardiance to take    Last times/dates taken verified with patient:  [] Yes- completed myself  [] Prepared PTA medlist for review only. (will not be available to review personally)  [] Did not review with patient. Rx verification only. Review completed by nursing.    [x] Nurse completed no changes made (double checked entries)- pt admitted 4 days  [] Unable to review with patient at this time:    Allergies listed at another location:  []Allergies match allergies listed in Epic  []Other allergies listed:    Allergy review:    []Did not review: reviewed by nursing  []Did not review: pt unable at this time  [x]Verified current existing allergies or NKDA: no changes made   []Patient confirmed current existing allergies and new allergies added:    Medication reconciliation sources:   [x]Patient  []Patient family member/emergency contact: **  [][facility] Report Review  []Epic Chart Review  [x]Care Everywhere review: [facility] and  [facility]  []Pharmacy med list/phone call: **  [x]Outside meds dispense report  []Nursing home or Assisted Living MAR:  []Other: **    Pharmacy desired at discharge: [facility]    Is patient on coumadin?   [x]No  []Yes    Requests for consultation by provider or pharmacist:   [x] Patient understands why all of their meds were prescribed and how to take them. No questions.   [] Managing party has no questions.   [] Patient/ managing party has questions about the following:  [] Did not review with patient. Cannot assess.     Fill dates and reported compliancy:  [x] Fill dates coincide with reported compliancy for all/most maintenance meds.   [] Not applicable. Patient is not taking any maintenance medications at this time.   [] Fill dates do not coincide with compliancy with maintenance meds. See notes in PTA medlist and in comments.    [] Fill dates do not coincide with the following medications but pt reports compliancy:  [] Did not review with patient. Cannot assess.     Historian accuracy:  [] Excellent- alert and oriented, understands why meds were prescribed and how to take, able to answer specifics  [x] Good- alert and oriented, understands why meds were prescribed and how to take, some confusion   [] Fair- alert and oriented, doesn't know medications without list, cannot answer specifics about medications, but has a decent process for which to take at home  [] Poor- does not know medications, may not have a process to take at home, may be cognitively unable to review at this time  []Medication management done by family member or facility, no concerns about historian accuracy.   [] Did not review with patient. Cannot assess.     Medication Management:  [x] Manages meds independently  [] Family member/ other party manages meds/assists:  [] Meds managed by staff at facility  [] Meds set up by home care, family/other party helps administer  [] Meds set up by home care, self administers  [] Did not review with  patient. Cannot assess.     Other medications aside from PTA:  [x] Denies taking any medications aside from those listed in PTA meds  [] Reports taking another medication(s) but cannot recall the name(s)  [] Refuses to say.  [] Did not review with patient. Cannot assess.     Comments: reports he is not always compliant with his medications.       Gillian Bowman on 10/10/2022 at 2:25 PM       Notifying appropriate party of changes/additions/discrepancies:  [x]No pertinent changes made, notification not necessary.   [] Notified attending provider via text page/phone call  [] Notified attending provider in person  [] Notified pharmacy  [] Notified nurse  [] Medications have not been reconciled by a provider yet, notification not necessary  [] Pt is not admitted to floor yet, PTA meds completed before admission.       Medications Prior to Admission   Medication Sig Dispense Refill Last Dose     aspirin (ASA) 81 MG chewable tablet Take 1 tablet (81 mg) by mouth daily 30 tablet 1 10/6/2022 at Unknown time     empagliflozin (JARDIANCE) 25 MG TABS tablet Take 25 mg by mouth daily (Patient crushes med to take.)   10/6/2022 at Unknown time     gemfibrozil (LOPID) 600 MG tablet Take 1 tablet (600 mg) by mouth daily   10/6/2022 at Unknown time     insulin glargine (LANTUS PEN) 100 UNIT/ML pen Inject 70 Units Subcutaneous At Bedtime   Past Month     liraglutide (VICTOZA) 18 MG/3ML solution Inject 1.8 mg Subcutaneous daily        metFORMIN (GLUCOPHAGE) 500 MG tablet Take 1 tablet (500 mg) by mouth 2 times daily (with meals) 60 tablet 1 10/6/2022 at Unknown time     paliperidone (INVEGA SUSTENNA) 156 MG/ML ALIDA injection Inject 156 mg into the muscle every 28 days Last given 9/14/2022 9/14/2022 at Unknown time     risperiDONE (RISPERDAL) 1 MG tablet Take 1 tablet (1 mg) by mouth 2 times daily   Past Week     B-D U/F 31G X 8 MM insulin pen needle Use as directed with lantus pen.        ONETOUCH ULTRA test strip Use as directed to  test blood glucose up to twice daily.

## 2022-10-10 NOTE — PROGRESS NOTES
"[facility] PSYCHIATRY  PROGRESS NOTE     SUBJECTIVE     This is my first time meeting Bandar.  Verbal hand-off report received from patient's previous provider. Prior to interviewing the patient, I met with nursing and reviewed patient's clinical condition. We discussed clinical care both before and after the interview. I have reviewed the patient's clinical course by review of records including previous notes, labs, and vital signs.     Per nursing, the patient had the following behavioral events over the last 24-hours: none    I was notified in team that patient wanted to leave today. On psychiatric interview, patient states he feels safe in the hospital and is afraid to return to the community. Discusses fear that people are talking about him when in public and asks if I think he should just go through drive throughs rather than entering the store. He has been able to tolerate groups. Joyce Nick's office called to schedule a follow-up for Wednesday afternoon. Discussed discharge tomorrow afternoon.      Denies feeling depressed, \"some anxiety when I get stuck in my head\", no SI/HI. Tolerating the increase in Risperdal w/o any side effects. Appetite is good.     MEDICATIONS   Scheduled Meds:    aspirin  81 mg Oral Daily     empagliflozin  25 mg Oral Daily     gemfibrozil  600 mg Oral Daily     insulin aspart  1-7 Units Subcutaneous TID AC     insulin aspart  1-5 Units Subcutaneous At Bedtime     insulin glargine  70 Units Subcutaneous At Bedtime     liraglutide  1.8 mg Subcutaneous Daily     metFORMIN  500 mg Oral BID w/meals     nicotine  1 patch Transdermal Daily     nicotine   Transdermal Q8H     risperiDONE  2 mg Sublingual BID     PRN Meds:.acetaminophen, glucose **OR** dextrose **OR** glucagon, hydrOXYzine, nicotine, OLANZapine **OR** OLANZapine     ALLERGIES   No Known Allergies     MENTAL STATUS EXAM   Vitals: /70   Pulse 84   Temp 97.8  F (36.6  C) (Temporal)   Resp 12   Ht 1.854 m (6' 1\")  "  Wt 119.5 kg (263 lb 8 oz)   SpO2 98%   BMI 34.76 kg/m      Appearance:  awake, alert, adequately groomed and dressed in hospital scrubs  Attitude:  cooperative  Eye Contact:  good  Mood: anxious   Affect:  intensity is blunted  Speech:  clear, coherent  Psychomotor Behavior:  no evidence of tardive dyskinesia, dystonia, or tics  Thought Process:  logical and goal oriented  Associations:  no loose associations  Thought Content:  no evidence of suicidal ideation or homicidal ideation and no evidence of psychotic thought  Insight:  limited  Judgment:  limited  Oriented to:  time, person, and place  Attention Span and Concentration:  fair  Recent and Remote Memory:  limited  Language: Able to name objects, Able to repeat phrases and Able to read and write  Fund of Knowledge: appropriate  Muscle Strength and Tone: normal  Gait and Station: Normal       LABS   Recent Results (from the past 24 hour(s))   Glucose by meter    Collection Time: 10/09/22  7:54 PM   Result Value Ref Range    GLUCOSE BY METER POCT 148 (H) 70 - 99 mg/dL   Glucose by meter    Collection Time: 10/10/22  7:49 AM   Result Value Ref Range    GLUCOSE BY METER POCT 71 70 - 99 mg/dL         IMPRESSION   This is a 47 year old male with a PMH of schizoaffective disorder bipolar type and ASD admitted to the unit following his discharge on 9.21.22. Pt was doing well following discharge until he stopped taking his Risperdal for 3 days. He became paranoid, fearful of people chasing him and did not feel safe at home. He went to his aunt who lives across the street, he admitted to not taking his medication and resumed the prescribed Risperdal 1 mg BID under supervision from his aunt. Initially his symptoms improved however he remained paranoid and delusional, fearful of others chasing him. He is glad to be back on the unit where he feels safe. Is taking medications.         DIAGNOSES   Schizoaffective disorder, bipolar type       PLAN     Location: Unit  5  Legal Status: Orders Placed This Encounter      Voluntary    Safety Assessment:    Behavioral Orders   Procedures     Code 1 - Restrict to Unit     Routine Programming     As clinically indicated     Status 15     Every 15 minutes.      PTA medications continued/changed:   Risperdal 1 mg BID -> increased to 1 mg in the am and 2 at hs-> 2 mg BID 10.9.22  Lopid 600 mg daily  Victoza 1.8 mg daily  Glucophage 500 mg BID with meals  Jardiance 25 mg daily      New medications tried and stopped:     -None    New medications initiated:   none    Today's Changes:  Risperdal increased to 2 mg BID    Programming: Patient will be treated in a therapeutic milieu with appropriate individual and group therapies. Education will be provided on diagnoses, medications, and treatments.     Medical diagnoses:  Per medicine    Consult: None  Tests: None    Anticipated LOS: 2-4 days  Disposition: home with parents       TREATMENT TEAM CARE PLAN     Progress: Continued symptoms.    Continued Stay Criteria/Rationale: Continued symptoms without sufficient improvement/resolution.    Medical/Physical: See above.    Precautions: See above.     Plan: Continue inpatient care with unit support and medication management.    Rationale for change in precautions or plan: NA due to no change.    Participants: Radha Elizabeth NP, Nursing, SW, OT.    The patient's care was discussed with the treatment team and chart notes were reviewed.       ATTESTATION      Radha Elizabeth NP

## 2022-10-10 NOTE — PLAN OF CARE
"  Problem: Adult Behavioral Health Plan of Care  Goal: Patient-Specific Goal (Individualization)  Description: You can add care plan individualizations to a care plan. Examples of Individualization might be:  \"Parent requests to be called daily at 9am for status\", \"I have a hard time hearing out of my right ear\", or \"Do not touch me to wake me up as it startles me\".  Note: Blood sugar is 71 this morning.    Patient is up to unit for breakfast, compliant with scheduled medications crushed and in water per request. Affect is flat/blunt and clear speech during conversation. Denies all assessment criteria at this time and asking when he can see a provider to possibly discharge today.   Later in the morning, patient does report being nervous about discharging. States, \"I feel safe here, it's when I get out there in the real world that scares me\". Patient talks about \"panic attacks\" and difficulties with running errands and being in certain social settings. Writer educated patient on numerous resources and encouraged to talk to provider about possibility of a PRN medication to help when getting home. Patient provides some insight and reports he plans to go to all his med appointments, therapy and hoping to go to a support group twice a week.   Politely refused afternoon blood sugar, reporting he only wants it at 8 am and 8 pm. Up to unit with peers for majority of shift, watching television and participating in groups. Maintains appropriate boundaries with staff and peers. Showered this shift. No concerns at this time. Continue to monitor.      Problem: Suicidal Behavior  Goal: Suicidal Behavior is Absent or Managed  Note: Continue to monitor at this time.      Problem: Thought Process Alteration  Goal: Optimal Thought Clarity  Description: PT will have clear thoughts before discharge  PT will be compliant with medications  Note: Continue to monitor at this time.     Face to face end of shift report communicated to " oncoming RN.     Jody Cao RN  10/10/2022  11:03 AM

## 2022-10-10 NOTE — PLAN OF CARE
"Problem: Adult Behavioral Health Plan of Care  Goal: Patient-Specific Goal (Individualization)  Description: You can add care plan individualizations to a care plan. Examples of Individualization might be:  \"Parent requests to be called daily at 9am for status\", \"I have a hard time hearing out of my right ear\", or \"Do not touch me to wake me up as it startles me\".  Outcome: Progressing     Problem: Suicidal Behavior  Goal: Suicidal Behavior is Absent or Managed  Outcome: Progressing     Problem: Thought Process Alteration  Goal: Optimal Thought Clarity  Description: PT will have clear thoughts before discharge  PT will be compliant with medications  Outcome: Progressing    Face to face shift report received from JULIO CESAR Dumont. Rounding completed, pt observed laying in bed, appeared to be sleeping.    Patient appeared to be sleeping for approximately 9.25 hours since 2115 last shift.    Patient had no reported or observed self harm this shift.      Per patient request, he does not want to be woken up for 0200 or AM BG. He only takes it at home twice daily and requested only 8 & 8 while here.     Face to face report will be communicated to oncoming RN.    Neisha Nick RN  10/10/2022  6:33 AM    "

## 2022-10-10 NOTE — PLAN OF CARE
Problem: Suicidal Behavior  Goal: Suicidal Behavior is Absent or Managed  Outcome: Progressing    Pt denies SI      Problem: Thought Process Alteration  Goal: Optimal Thought Clarity  Description: PT will have clear thoughts before discharge  PT will be compliant with medications  Outcome: Progressing    Pt is still having some hallucinations but they are not too intrusive     Problem: Adult Behavioral Health Plan of Care  Goal: Patient-Specific Goal (Individualization)  Description:Patient will sleep 6 to 8 hours per night  Patient will eat at least 50% of meals  Patient will attend at least 50% of groups  Patient will comply with recommendations of treatment team  Patient will remain medication compliant  Outcome: Progressing   Goal Outcome Evaluation:    Plan of Care Reviewed With: patient      1530 Face to face rounding complete.  Pt introduced to nursing for the shift.     Pt was up and active all shift.  He did attend some of the available groups this evening. He told me that he is still having some hallucinations but that they are not as. Intrusive.  He is frustrated that his provider wants him to stay longer. He is worried that he has been out of work for three weeks now.  Pt is hoping that he can get his long acting Invega injection increased and given before being discharged. He was encouraged to talk with his provider Monday morning.  Pt's affect was a bit brighter this evening and he spent most of the evening in the dayroom with peers watching TV and coloring. He declined to have his blood sugar checked at supper time and declined his HS Metformin.       2300 Face to face end of shift report communicated to Night shift RN's along with Pt's fall risk.      Tim Macdonald RN  10/9/2022

## 2022-10-11 VITALS
WEIGHT: 263.5 LBS | RESPIRATION RATE: 14 BRPM | DIASTOLIC BLOOD PRESSURE: 76 MMHG | HEIGHT: 73 IN | HEART RATE: 90 BPM | SYSTOLIC BLOOD PRESSURE: 101 MMHG | OXYGEN SATURATION: 98 % | BODY MASS INDEX: 34.92 KG/M2 | TEMPERATURE: 98 F

## 2022-10-11 LAB — GLUCOSE BLDC GLUCOMTR-MCNC: 94 MG/DL (ref 70–99)

## 2022-10-11 PROCEDURE — 250N000013 HC RX MED GY IP 250 OP 250 PS 637: Performed by: NURSE PRACTITIONER

## 2022-10-11 PROCEDURE — 99239 HOSP IP/OBS DSCHRG MGMT >30: CPT | Performed by: NURSE PRACTITIONER

## 2022-10-11 PROCEDURE — 250N000013 HC RX MED GY IP 250 OP 250 PS 637: Performed by: PSYCHIATRY & NEUROLOGY

## 2022-10-11 RX ADMIN — ASPIRIN 81 MG 81 MG: 81 TABLET ORAL at 08:55

## 2022-10-11 RX ADMIN — NICOTINE 1 PATCH: 21 PATCH, EXTENDED RELEASE TRANSDERMAL at 08:55

## 2022-10-11 RX ADMIN — LIRAGLUTIDE 1.8 MG: 6 INJECTION SUBCUTANEOUS at 08:55

## 2022-10-11 RX ADMIN — METFORMIN HYDROCHLORIDE 500 MG: 500 TABLET, FILM COATED ORAL at 09:03

## 2022-10-11 RX ADMIN — RISPERIDONE 2 MG: 2 TABLET, ORALLY DISINTEGRATING ORAL at 08:55

## 2022-10-11 RX ADMIN — EMPAGLIFLOZIN 25 MG: 25 TABLET, FILM COATED ORAL at 08:54

## 2022-10-11 RX ADMIN — GEMFIBROZIL 600 MG: 600 TABLET ORAL at 08:54

## 2022-10-11 ASSESSMENT — ACTIVITIES OF DAILY LIVING (ADL)
ADLS_ACUITY_SCORE: 40
HYGIENE/GROOMING: INDEPENDENT
ADLS_ACUITY_SCORE: 40
ADLS_ACUITY_SCORE: 40
DRESS: SCRUBS (BEHAVIORAL HEALTH);INDEPENDENT
ADLS_ACUITY_SCORE: 40
ADLS_ACUITY_SCORE: 40
ORAL_HYGIENE: INDEPENDENT
ADLS_ACUITY_SCORE: 40

## 2022-10-11 NOTE — DISCHARGE SUMMARY
[facility] PSYCHIATRY  DISCHARGE SUMMARY     DISCHARGE DATA     Bandar Lawson MRN# [id number]   Age: 47 year old YOB: 1975     Date of Admission: 10/6/2022  Date of Discharge: October 11, 2022  Discharge Provider: Radha Elizabeth NP       REASON FOR ADMISSION     Bandar Lawson is a 48 yo male who presents to the ED at [facility] with worsening symptoms of of paranoia and delusional thinking. Pt was discharged from this unit on 9.21.22 at which time he was stable, no delusions or paranoia. He was seen in OP clinic 10 days ago at which time he was doing very well. He was instructed to continue on the oral risperdal until he returned to the clinic for his next Invega injection on Oct 24. We had also discussed possibly using the risperdal PRN during high stress times but would further address at his follow up. Pt was staying at his family's cabin near [place] which is across the road from his aunt who is a LICSW. 5 days after his visit his aunt noticed Bandar's behavior was paranoid, she asked if he was taking his medications and he told her he hadn't taken them for the past 4 days. Risperdal 1 mg BID was resumed with minimal improvement therefore family made the decision to transport pt to the ED here at [facility].       DISCHARGE DIAGNOSES     1.Schizoaffective disorder, bipolar type  2. ASD, by history       CONSULTS     None       HOSPITAL COURSE     Legal status: Orders Placed This Encounter      Voluntary    Patient was admitted to unit 5 due to the aforementioned presentation. The patient was placed under 15 minute checks to ensure patient safety. The patient participated in unit programming and groups as able.    The following changes to the patient's psychiatric medications were made:    PTA medications held:     -None    PTA medications continued/changed:     -Continue Risperdal 1 mg BID -> increased to 1 mg in the am and 2 at hs.-->increased to 2 mg BID  -Continue Invega Sustenna  156 mg IM every 28 days      New medications tried and stopped:     -None    New medications initiated:     -None    With these changes and supports the patient noticed improvement in their symptoms and patient requested to discharge. As discharge time came closer, patient began experiencing significant anxiety. He reported feeling safe in the hospital and was very fearful that wasn't going to be the case in the community. Offered to cancel discharge for continued stabilization, though patient declined. As a result, Bandar Lawson was discharged. At the time of discharge, Bandar Lawson was determined to not be a danger to self or others. The patient was also medically stable for discharge. At the current time of discharge, the patient does not meet criteria for involuntary hospitalization. On the day of discharge, the patient reports that they do not have suicidal or homicidal ideation. Steps taken to minimize risk include: assessing patient s behavior and thought process daily during hospital stay, discharging patient with adequate plan for follow up for mental and physical health and discussing safety plan of returning to the hospital should the patient ever have thoughts of harming themselves or others. Therefore, based on all available evidence including the factors cited above, the patient does not appear to be at imminent risk for self-harm, and is appropriate for outpatient level of care. However, if patient uses substances or is medication non-adherent, their risk of decompensation and SI will be elevated. This was discussed with the patient.        DISCHARGE MEDICATIONS     Current Discharge Medication List      START taking these medications    Details   risperiDONE (RISPERDAL M-TABS) 2 MG ODT Place 1 tablet (2 mg) under the tongue 2 times daily  Qty: 60 tablet, Refills: 0    Associated Diagnoses: Psychosis, unspecified psychosis type (H)         CONTINUE these medications which have NOT CHANGED    Details  "  aspirin (ASA) 81 MG chewable tablet Take 1 tablet (81 mg) by mouth daily  Qty: 30 tablet, Refills: 1    Associated Diagnoses: Type 2 diabetes mellitus without complication, with long-term current use of insulin (H)      empagliflozin (JARDIANCE) 25 MG TABS tablet Take 25 mg by mouth daily (Patient crushes med to take.)      gemfibrozil (LOPID) 600 MG tablet Take 1 tablet (600 mg) by mouth daily    Associated Diagnoses: Hyperlipidemia LDL goal <100      insulin glargine (LANTUS PEN) 100 UNIT/ML pen Inject 70 Units Subcutaneous At Bedtime      liraglutide (VICTOZA) 18 MG/3ML solution Inject 1.8 mg Subcutaneous daily      metFORMIN (GLUCOPHAGE) 500 MG tablet Take 1 tablet (500 mg) by mouth 2 times daily (with meals)  Qty: 60 tablet, Refills: 1    Associated Diagnoses: Type 2 diabetes mellitus without complication, with long-term current use of insulin (H)      paliperidone (INVEGA SUSTENNA) 156 MG/ML ALIDA injection Inject 156 mg into the muscle every 28 days Last given 9/14/2022      B-D U/F 31G X 8 MM insulin pen needle Use as directed with lantus pen.      ONETOUCH ULTRA test strip Use as directed to test blood glucose up to twice daily.         STOP taking these medications       risperiDONE (RISPERDAL) 1 MG tablet Comments:   Reason for Stopping:             Reason for two or more neuroleptics: inadequate symptom relief with Invega Sustenna at current dose - higher dose too sedating        MENTAL STATUS EXAM   Vitals: /65   Pulse 81   Temp 97.5  F (36.4  C) (Temporal)   Resp 16   Ht 1.854 m (6' 1\")   Wt 119.5 kg (263 lb 8 oz)   SpO2 98%   BMI 34.76 kg/m      Appearance:  awake, alert, adequately groomed and dressed in hospital scrubs  Attitude:  cooperative  Eye Contact:  good  Mood:  better, remains slightly anxious with concerns his delusions may return  Affect:  intensity is blunted  Speech:  clear, coherent  Psychomotor Behavior:  no evidence of tardive dyskinesia, dystonia, or tics  Thought " Process:  logical and goal oriented  Associations:  no loose associations  Thought Content:  no evidence of suicidal ideation or homicidal ideation and no evidence of psychotic thought  Insight:  limited  Judgment:  limited  Oriented to:  time, person, and place  Attention Span and Concentration:  fair  Recent and Remote Memory:  limited  Language: Able to name objects, Able to repeat phrases and Able to read and write  Fund of Knowledge: appropriate  Muscle Strength and Tone: normal  Gait and Station: Normal       DISCHARGE PLAN     1.  Education given regarding diagnostic and treatment options with risks, benefits and alternatives with adequate verbalization of understanding.  2.  Discharge to home with his mother. Upon detailed review of risk factors, patient amenable for release.   3.  Continue aforementioned medications and associated medication changes with follow-up by outpatient provider.  4.  Crisis management planning in place.    5.  Nursing and  to review further discharge recommendations.   6.  Patient is being discharged with the following appointments as detailed below.    [facility]   Appointment: 10/12/2022 @ 1:00 pm for Medication Management - Joyce Nick  Appointment: 10/14/2022 @ 2:00 pm Patient needs to go to [place] for his long acting injection as usual.  Appointment: 11/09/2022 @ 11:00 am for Medication Management - Joyce Nick  [address] [place] [address]  Phone: [phone]  Fax: [phone]      [place] [address], [address]  [place] [address] [phone]     [facility] [place] Patient needs to call their office and provide updated information if needed.   Appointment: 10/18/2022 @ 3:30 pm Individual Therapy with Angelo Watters will call the patient at that time.   Appointment: 10/25/2022 @ 3:30 pm Individual Therapy with Angelo Watters will call the patient at  that time.   [email or web address]  [address] [place], [place] [address]  [phone]  Fax: [phone]       DISCHARGE SERVICES PROVIDED     40 minutes spent on discharge services, including:  Final examination of patient.  Review and discussion of hospital stay.  Instructions for continued outpatient care/goals.  Preparation of discharge records.  Preparation of medications refills and new prescriptions.  Preparation of applicable referral forms.        ATTESTATION     Radha Elizabeth, NP       LABS THIS ADMISSION     Results for orders placed or performed during the hospital encounter of 10/06/22   UA with Microscopic reflex to Culture     Status: Abnormal    Specimen: Urine, Clean Catch   Result Value Ref Range    Color Urine Light Yellow Colorless, Straw, Light Yellow, Yellow    Appearance Urine Clear Clear    Glucose Urine >1000 (A) Negative mg/dL    Bilirubin Urine Negative Negative    Ketones Urine Negative Negative mg/dL    Specific Gravity Urine 1.032 1.003 - 1.035    Blood Urine Negative Negative    pH Urine 7.0 4.7 - 8.0    Protein Albumin Urine Negative Negative mg/dL    Urobilinogen Urine Normal Normal, 2.0 mg/dL    Nitrite Urine Negative Negative    Leukocyte Esterase Urine Negative Negative    Mucus Urine Present (A) None Seen /LPF    RBC Urine <1 <=2 /HPF    WBC Urine 1 <=5 /HPF    Squamous Epithelials Urine 1 <=1 /HPF    Narrative    Urine Culture not indicated   Asymptomatic Influenza A/B & SARS-CoV2 (COVID-19) Virus PCR Multiplex Nasopharyngeal     Status: Normal    Specimen: Nasopharyngeal; Swab   Result Value Ref Range    Influenza A PCR Negative Negative    Influenza B PCR Negative Negative    RSV PCR Negative Negative    SARS CoV2 PCR Negative Negative    Narrative    Testing was performed using the Xpert Xpress CoV2/Flu/RSV Assay on the Cepheid GeneXpert Instrument. This test should be ordered for the detection of SARS-CoV-2 and influenza viruses in individuals who meet  clinical and/or epidemiological criteria. Test performance is unknown in asymptomatic patients. This test is for in vitro diagnostic use under the FDA EUA for laboratories certified under CLIA to perform high or moderate complexity testing. This test has not been FDA cleared or approved. A negative result does not rule out the presence of PCR inhibitors in the specimen or target RNA in concentration below the limit of detection for the assay. If only one viral target is positive but coinfection with multiple targets is suspected, the sample should be re-tested with another FDA cleared, approved, or authorized test, if coinfection would change clinical management. This test was validated by the [facility]. These laboratories are certified under the Clinical  Laboratory Improvement Amendments of 1988 (CLIA-88) as qualified to perform high complexity laboratory testing.   Basic metabolic panel     Status: Normal   Result Value Ref Range    Sodium 137 133 - 144 mmol/L    Potassium 4.3 3.4 - 5.3 mmol/L    Chloride 109 94 - 109 mmol/L    Carbon Dioxide (CO2) 23 20 - 32 mmol/L    Anion Gap 5 3 - 14 mmol/L    Urea Nitrogen 19 7 - 30 mg/dL    Creatinine 0.80 0.66 - 1.25 mg/dL    Calcium 9.8 8.5 - 10.1 mg/dL    Glucose 90 70 - 99 mg/dL    GFR Estimate >90 >60 mL/min/1.73m2   CBC with platelets and differential     Status: None   Result Value Ref Range    WBC Count 9.9 4.0 - 11.0 10e3/uL    RBC Count 5.23 4.40 - 5.90 10e6/uL    Hemoglobin 15.4 13.3 - 17.7 g/dL    Hematocrit 46.5 40.0 - 53.0 %    MCV 89 78 - 100 fL    MCH 29.4 26.5 - 33.0 pg    MCHC 33.1 31.5 - 36.5 g/dL    RDW 14.1 10.0 - 15.0 %    Platelet Count 306 150 - 450 10e3/uL    % Neutrophils 57 %    % Lymphocytes 35 %    % Monocytes 6 %    % Eosinophils 1 %    % Basophils 1 %    % Immature Granulocytes 0 %    NRBCs per 100 WBC 0 <1 /100    Absolute Neutrophils 5.8 1.6 - 8.3 10e3/uL    Absolute Lymphocytes 3.4 0.8 - 5.3 10e3/uL    Absolute  Monocytes 0.6 0.0 - 1.3 10e3/uL    Absolute Eosinophils 0.1 0.0 - 0.7 10e3/uL    Absolute Basophils 0.1 0.0 - 0.2 10e3/uL    Absolute Immature Granulocytes 0.0 <=0.4 10e3/uL    Absolute NRBCs 0.0 10e3/uL   Extra Tube     Status: None    Narrative    The following orders were created for panel order Extra Tube.  Procedure                               Abnormality         Status                     ---------                               -----------         ------                     Extra Blue Top Tube[[id number]]                              Final result               Extra Red Top Tube[[id number]]                               Final result               Extra Green Top (Lithium...[[id number]]                      Final result                 Please view results for these tests on the individual orders.   Extra Blue Top Tube     Status: None   Result Value Ref Range    Hold Specimen JIC    Extra Red Top Tube     Status: None   Result Value Ref Range    Hold Specimen JIC    Extra Green Top (Lithium Heparin) Tube     Status: None   Result Value Ref Range    Hold Specimen JIC    Glucose by meter     Status: Abnormal   Result Value Ref Range    GLUCOSE BY METER POCT 122 (H) 70 - 99 mg/dL   Glucose by meter     Status: Normal   Result Value Ref Range    GLUCOSE BY METER POCT 94 70 - 99 mg/dL   Glucose by meter     Status: Abnormal   Result Value Ref Range    GLUCOSE BY METER POCT 110 (H) 70 - 99 mg/dL   Glucose by meter     Status: Normal   Result Value Ref Range    GLUCOSE BY METER POCT 72 70 - 99 mg/dL   Glucose by meter     Status: Abnormal   Result Value Ref Range    GLUCOSE BY METER POCT 162 (H) 70 - 99 mg/dL   Glucose by meter     Status: Normal   Result Value Ref Range    GLUCOSE BY METER POCT 95 70 - 99 mg/dL   Glucose by meter     Status: Abnormal   Result Value Ref Range    GLUCOSE BY METER POCT 148 (H) 70 - 99 mg/dL   Glucose by meter     Status: Normal   Result Value Ref Range    GLUCOSE BY METER POCT 71 70 - 99  mg/dL   Glucose by meter     Status: Normal   Result Value Ref Range    GLUCOSE BY METER POCT 85 70 - 99 mg/dL   Glucose by meter     Status: Normal   Result Value Ref Range    GLUCOSE BY METER POCT 94 70 - 99 mg/dL   CBC with platelets differential     Status: None    Narrative    The following orders were created for panel order CBC with platelets differential.  Procedure                               Abnormality         Status                     ---------                               -----------         ------                     CBC with platelets and d...[[id number]]                      Final result                 Please view results for these tests on the individual orders.

## 2022-10-11 NOTE — PROGRESS NOTES
Pt is discharging at the recommendation of the treatment team. Pt is discharging to home transported by his mom . Pt denies having any thoughts of hurting themself or anyone else. Pt denies anxiety or depression. Pt has follow up with  Joyce Nick for Medication management, and Johnathon and Monica for Individual Therapy. The patient requested to schedule his own primary care appointments.  Discharge instructions, including; demographic sheet, psychiatric evaluation, discharge summary, and AVS were faxed to these next level of care providers.

## 2022-10-11 NOTE — PLAN OF CARE
Problem: Adult Behavioral Health Plan of Care  Goal: Patient-Specific Goal (Individualization)  Description: Pt will sleep at least 6-8 hours per NOC shift.  Pt will eat at least 50% of meals.  Pt will be medication compliant.  Pt will follow treatment team recommendations,  Pt will attend at least 50% of groups.      Outcome: Adequate for Care Transition  Note:     0900 Patient is alert and up on the unit. Patient all of am medications after some medication education as he stated since his blood sugar was low he did not have to take his jardance or victoza. Patient took all of meds and they are crushed for his use. Patient denies depression but voiced some concern about his doing ok in the outside world. Spoke with patient's mother and she will  his medications and then come to pick him up as well.     1350 Patient up on the unit and attending groups. No complaints.     Face to face end of shift report communicated to 3-11 shift RN.     Janell Gonzalez RN  10/11/2022  1:57 PM    Discharge Note    Patient Discharged to home on 10/11/2022 3:26 PM via Private Car accompanied by [address] [place] [address] unit.     Patient informed of discharge instructions in AVS. patient verbalizes understanding and denies having any questions pertaining to AVS. Patient stable at time of discharge. Patient denies SI, HI, and thoughts of self harm at time of discharge. All personal belongings returned to patient. Discharge prescriptions sent to [facility] via electronic communication. Psych evaluation, history and physical, AVS, and discharge summary faxed to next level of care- outpatient, Joyce Nick NP.     Janell Gonzalez RN  10/11/2022  3:31 PM        Problem: Suicidal Behavior  Goal: Suicidal Behavior is Absent or Managed  Description: Pt will contract for safety  Pt will remain free of injury and self harm  Pt will report no suicidal ideation by discharge  Pt will will report manageable depression by discharge  Pt will report  manageable anxiety by discharge  Pt will identify at least 3 positive coping skills.        Outcome: Adequate for Care Transition     Problem: Thought Process Alteration  Goal: Optimal Thought Clarity  Description: Pt will be able to maintain linear and reality based conversations.   Pt will make their needs known.  Pt will report manageable hallucinations by discharge.  Pt will contract for safety while on the unit.       Outcome: Adequate for Care Transition      Goal Outcome Evaluation:    Plan of Care Reviewed With: patient

## 2022-10-11 NOTE — PLAN OF CARE
Problem: Adult Behavioral Health Plan of Care  Goal: Patient-Specific Goal (Individualization)  Description: Pt will sleep at least 6-8 hours per NOC shift.  Pt will eat at least 50% of meals.  Pt will be medication compliant.  Pt will follow treatment team recommendations,  Pt will attend at least 50% of groups.      10/11/2022 0355 by Jen Callejas, RN  Note: Report received from Blanca. Rounding complete. Pt observed sleeping in left side  lying position with regular and unlabored respirations.    Pt has been in bed with eyes closed and regular respirations. 15 minute and PRN checks all night. No complaints offered. Will continue to monitor.    Pt slept approx 9.5   hours this NOC shift.    Face to face end of shift report communicated to oncoming RN.    Jen BLACK RN  October 11, 2022  3:55 AM          Problem: Suicidal Behavior  Goal: Suicidal Behavior is Absent or Managed  Description: Pt will contract for safety  Pt will remain free of injury and self harm  Pt will report no suicidal ideation by discharge  Pt will will report manageable depression by discharge  Pt will report manageable anxiety by discharge  Pt will identify at least 3 positive coping skills.        10/11/2022 0355 by Jen Callejas, RN  Note: Unable to assess due to pt sleeping. Pt has remained free of self-harm.       Problem: Thought Process Alteration  Goal: Optimal Thought Clarity  Description: Pt will be able to maintain linear and reality based conversations.   Pt will make their needs known.  Pt will report manageable hallucinations by discharge.  Pt will contract for safety while on the unit.       10/11/2022 0355 by Jen Callejas, RN  Note: Unable to assess due to pt sleeping. No issues or concerns noted at this time.     Goal Outcome Evaluation: